# Patient Record
Sex: FEMALE | Race: WHITE | NOT HISPANIC OR LATINO | Employment: FULL TIME | URBAN - METROPOLITAN AREA
[De-identification: names, ages, dates, MRNs, and addresses within clinical notes are randomized per-mention and may not be internally consistent; named-entity substitution may affect disease eponyms.]

---

## 2017-06-15 ENCOUNTER — LAB VISIT (OUTPATIENT)
Dept: LAB | Facility: HOSPITAL | Age: 47
End: 2017-06-15
Attending: INTERNAL MEDICINE
Payer: COMMERCIAL

## 2017-06-15 DIAGNOSIS — C50.919 BREAST CA: ICD-10-CM

## 2017-06-15 LAB
ALBUMIN SERPL BCP-MCNC: 4.4 G/DL
ALP SERPL-CCNC: 95 U/L
ALT SERPL W/O P-5'-P-CCNC: 66 U/L
ANION GAP SERPL CALC-SCNC: 9 MMOL/L
AST SERPL-CCNC: 52 U/L
BASOPHILS # BLD AUTO: 0.02 K/UL
BASOPHILS NFR BLD: 0.3 %
BILIRUB SERPL-MCNC: 0.8 MG/DL
BUN SERPL-MCNC: 12 MG/DL
CALCIUM SERPL-MCNC: 9.3 MG/DL
CHLORIDE SERPL-SCNC: 104 MMOL/L
CO2 SERPL-SCNC: 30 MMOL/L
CREAT SERPL-MCNC: 0.53 MG/DL
DIFFERENTIAL METHOD: NORMAL
EOSINOPHIL # BLD AUTO: 0.2 K/UL
EOSINOPHIL NFR BLD: 2.2 %
ERYTHROCYTE [DISTWIDTH] IN BLOOD BY AUTOMATED COUNT: 12.2 %
EST. GFR  (AFRICAN AMERICAN): >60 ML/MIN/1.73 M^2
EST. GFR  (NON AFRICAN AMERICAN): >60 ML/MIN/1.73 M^2
GLUCOSE SERPL-MCNC: 108 MG/DL
HCT VFR BLD AUTO: 39.1 %
HGB BLD-MCNC: 12.9 G/DL
LYMPHOCYTES # BLD AUTO: 2.7 K/UL
LYMPHOCYTES NFR BLD: 35.5 %
MCH RBC QN AUTO: 30.6 PG
MCHC RBC AUTO-ENTMCNC: 33 %
MCV RBC AUTO: 93 FL
MONOCYTES # BLD AUTO: 0.7 K/UL
MONOCYTES NFR BLD: 8.8 %
NEUTROPHILS # BLD AUTO: 4.1 K/UL
NEUTROPHILS NFR BLD: 53.2 %
NRBC BLD-RTO: 0 /100 WBC
PLATELET # BLD AUTO: 235 K/UL
PMV BLD AUTO: 9.2 FL
POTASSIUM SERPL-SCNC: 4 MMOL/L
PROT SERPL-MCNC: 7.5 G/DL
RBC # BLD AUTO: 4.21 M/UL
SODIUM SERPL-SCNC: 143 MMOL/L
WBC # BLD AUTO: 7.71 K/UL

## 2017-06-15 PROCEDURE — 85025 COMPLETE CBC W/AUTO DIFF WBC: CPT

## 2017-06-15 PROCEDURE — 80053 COMPREHEN METABOLIC PANEL: CPT | Mod: PN

## 2017-06-15 PROCEDURE — 36415 COLL VENOUS BLD VENIPUNCTURE: CPT | Mod: PN

## 2017-06-15 PROCEDURE — 80053 COMPREHEN METABOLIC PANEL: CPT

## 2017-06-15 PROCEDURE — 85025 COMPLETE CBC W/AUTO DIFF WBC: CPT | Mod: PN

## 2017-12-21 ENCOUNTER — LAB VISIT (OUTPATIENT)
Dept: LAB | Facility: HOSPITAL | Age: 47
End: 2017-12-21
Attending: INTERNAL MEDICINE
Payer: COMMERCIAL

## 2017-12-21 DIAGNOSIS — C50.919 MALIGNANT NEOPLASM OF FEMALE BREAST: ICD-10-CM

## 2017-12-21 LAB
ALBUMIN SERPL BCP-MCNC: 4.4 G/DL
ALP SERPL-CCNC: 84 U/L
ALT SERPL W/O P-5'-P-CCNC: 67 U/L
ANION GAP SERPL CALC-SCNC: 11 MMOL/L
AST SERPL-CCNC: 44 U/L
BASOPHILS # BLD AUTO: 0.03 K/UL
BASOPHILS NFR BLD: 0.4 %
BILIRUB SERPL-MCNC: 0.4 MG/DL
BUN SERPL-MCNC: 11 MG/DL
CALCIUM SERPL-MCNC: 9.3 MG/DL
CHLORIDE SERPL-SCNC: 105 MMOL/L
CO2 SERPL-SCNC: 28 MMOL/L
CREAT SERPL-MCNC: 0.49 MG/DL
DIFFERENTIAL METHOD: ABNORMAL
EOSINOPHIL # BLD AUTO: 0.2 K/UL
EOSINOPHIL NFR BLD: 2.2 %
ERYTHROCYTE [DISTWIDTH] IN BLOOD BY AUTOMATED COUNT: 11.9 %
EST. GFR  (AFRICAN AMERICAN): >60 ML/MIN/1.73 M^2
EST. GFR  (NON AFRICAN AMERICAN): >60 ML/MIN/1.73 M^2
GLUCOSE SERPL-MCNC: 91 MG/DL
HCT VFR BLD AUTO: 40.3 %
HGB BLD-MCNC: 13 G/DL
LYMPHOCYTES # BLD AUTO: 2.3 K/UL
LYMPHOCYTES NFR BLD: 28.9 %
MCH RBC QN AUTO: 30.2 PG
MCHC RBC AUTO-ENTMCNC: 32.3 G/DL
MCV RBC AUTO: 94 FL
MONOCYTES # BLD AUTO: 0.6 K/UL
MONOCYTES NFR BLD: 7.6 %
NEUTROPHILS # BLD AUTO: 4.7 K/UL
NEUTROPHILS NFR BLD: 60.9 %
NRBC BLD-RTO: 0 /100 WBC
PLATELET # BLD AUTO: 243 K/UL
PMV BLD AUTO: 9.1 FL
POTASSIUM SERPL-SCNC: 3.8 MMOL/L
PROT SERPL-MCNC: 7.7 G/DL
RBC # BLD AUTO: 4.3 M/UL
SODIUM SERPL-SCNC: 144 MMOL/L
WBC # BLD AUTO: 7.79 K/UL

## 2017-12-21 PROCEDURE — 36415 COLL VENOUS BLD VENIPUNCTURE: CPT | Mod: PN

## 2017-12-21 PROCEDURE — 80053 COMPREHEN METABOLIC PANEL: CPT

## 2017-12-21 PROCEDURE — 85025 COMPLETE CBC W/AUTO DIFF WBC: CPT

## 2017-12-21 PROCEDURE — 80053 COMPREHEN METABOLIC PANEL: CPT | Mod: PN

## 2017-12-21 PROCEDURE — 85025 COMPLETE CBC W/AUTO DIFF WBC: CPT | Mod: PN

## 2018-01-01 ENCOUNTER — LAB VISIT (OUTPATIENT)
Dept: LAB | Facility: HOSPITAL | Age: 48
End: 2018-01-01
Attending: INTERNAL MEDICINE
Payer: COMMERCIAL

## 2018-01-01 DIAGNOSIS — Z17.0 MALIGNANT NEOPLASM OF CENTRAL PORTION OF RIGHT BREAST IN FEMALE, ESTROGEN RECEPTOR POSITIVE: ICD-10-CM

## 2018-01-01 DIAGNOSIS — C50.111 MALIGNANT NEOPLASM OF CENTRAL PORTION OF RIGHT BREAST IN FEMALE, ESTROGEN RECEPTOR POSITIVE: ICD-10-CM

## 2018-01-01 LAB
ALBUMIN SERPL BCP-MCNC: 4.5 G/DL
ALP SERPL-CCNC: 81 U/L
ALT SERPL W/O P-5'-P-CCNC: 152 U/L
ANION GAP SERPL CALC-SCNC: 12 MMOL/L
AST SERPL-CCNC: 122 U/L
BASOPHILS # BLD AUTO: 0.03 K/UL
BASOPHILS NFR BLD: 0.3 %
BILIRUB SERPL-MCNC: 0.6 MG/DL
BUN SERPL-MCNC: 18 MG/DL
CALCIUM SERPL-MCNC: 10 MG/DL
CHLORIDE SERPL-SCNC: 101 MMOL/L
CO2 SERPL-SCNC: 28 MMOL/L
CREAT SERPL-MCNC: 0.47 MG/DL
DIFFERENTIAL METHOD: ABNORMAL
EOSINOPHIL # BLD AUTO: 0.1 K/UL
EOSINOPHIL NFR BLD: 1.5 %
ERYTHROCYTE [DISTWIDTH] IN BLOOD BY AUTOMATED COUNT: 12.1 %
EST. GFR  (AFRICAN AMERICAN): >60 ML/MIN/1.73 M^2
EST. GFR  (NON AFRICAN AMERICAN): >60 ML/MIN/1.73 M^2
GLUCOSE SERPL-MCNC: 97 MG/DL
HCT VFR BLD AUTO: 40 %
HGB BLD-MCNC: 13.1 G/DL
LYMPHOCYTES # BLD AUTO: 2.9 K/UL
LYMPHOCYTES NFR BLD: 32.1 %
MCH RBC QN AUTO: 29.6 PG
MCHC RBC AUTO-ENTMCNC: 32.8 G/DL
MCV RBC AUTO: 91 FL
MONOCYTES # BLD AUTO: 0.6 K/UL
MONOCYTES NFR BLD: 7.1 %
NEUTROPHILS # BLD AUTO: 5.3 K/UL
NEUTROPHILS NFR BLD: 59 %
NRBC BLD-RTO: 0 /100 WBC
PLATELET # BLD AUTO: 247 K/UL
PMV BLD AUTO: 8.7 FL
POTASSIUM SERPL-SCNC: 3.8 MMOL/L
PROT SERPL-MCNC: 7.9 G/DL
RBC # BLD AUTO: 4.42 M/UL
SODIUM SERPL-SCNC: 141 MMOL/L
WBC # BLD AUTO: 8.93 K/UL

## 2018-01-01 PROCEDURE — 80053 COMPREHEN METABOLIC PANEL: CPT | Mod: PN

## 2018-01-01 PROCEDURE — 80053 COMPREHEN METABOLIC PANEL: CPT

## 2018-01-01 PROCEDURE — 85025 COMPLETE CBC W/AUTO DIFF WBC: CPT

## 2018-01-01 PROCEDURE — 36415 COLL VENOUS BLD VENIPUNCTURE: CPT | Mod: PN

## 2018-01-01 PROCEDURE — 85025 COMPLETE CBC W/AUTO DIFF WBC: CPT | Mod: PN

## 2019-01-01 ENCOUNTER — HOSPITAL ENCOUNTER (INPATIENT)
Facility: HOSPITAL | Age: 49
LOS: 4 days | DRG: 640 | End: 2019-06-01
Attending: EMERGENCY MEDICINE | Admitting: EMERGENCY MEDICINE
Payer: COMMERCIAL

## 2019-01-01 ENCOUNTER — LAB VISIT (OUTPATIENT)
Dept: LAB | Facility: HOSPITAL | Age: 49
End: 2019-01-01
Attending: INTERNAL MEDICINE
Payer: COMMERCIAL

## 2019-01-01 ENCOUNTER — DOCUMENTATION ONLY (OUTPATIENT)
Dept: HEMATOLOGY/ONCOLOGY | Facility: CLINIC | Age: 49
End: 2019-01-01

## 2019-01-01 VITALS
BODY MASS INDEX: 25.74 KG/M2 | WEIGHT: 164 LBS | TEMPERATURE: 96 F | RESPIRATION RATE: 14 BRPM | SYSTOLIC BLOOD PRESSURE: 114 MMHG | HEART RATE: 87 BPM | OXYGEN SATURATION: 97 % | DIASTOLIC BLOOD PRESSURE: 55 MMHG | HEIGHT: 67 IN

## 2019-01-01 DIAGNOSIS — G93.41 ENCEPHALOPATHY, METABOLIC: ICD-10-CM

## 2019-01-01 DIAGNOSIS — E83.42 HYPOMAGNESEMIA: ICD-10-CM

## 2019-01-01 DIAGNOSIS — C50.919 MALIGNANT NEOPLASM OF FEMALE BREAST, UNSPECIFIED ESTROGEN RECEPTOR STATUS, UNSPECIFIED LATERALITY, UNSPECIFIED SITE OF BREAST: ICD-10-CM

## 2019-01-01 DIAGNOSIS — J96.11 CHRONIC RESPIRATORY FAILURE WITH HYPOXIA AND HYPERCAPNIA: ICD-10-CM

## 2019-01-01 DIAGNOSIS — C50.919 MALIGNANT NEOPLASM OF BREAST, STAGE 4, UNSPECIFIED LATERALITY: ICD-10-CM

## 2019-01-01 DIAGNOSIS — R79.89 LIVER FUNCTION TEST ABNORMALITY: ICD-10-CM

## 2019-01-01 DIAGNOSIS — I31.39 PERICARDIAL EFFUSION: ICD-10-CM

## 2019-01-01 DIAGNOSIS — E83.52 HYPERCALCEMIA OF MALIGNANCY: Primary | ICD-10-CM

## 2019-01-01 DIAGNOSIS — R74.01 TRANSAMINASEMIA: ICD-10-CM

## 2019-01-01 DIAGNOSIS — R18.8 OTHER ASCITES: ICD-10-CM

## 2019-01-01 DIAGNOSIS — R53.83 FATIGUE: ICD-10-CM

## 2019-01-01 DIAGNOSIS — J96.12 CHRONIC RESPIRATORY FAILURE WITH HYPOXIA AND HYPERCAPNIA: ICD-10-CM

## 2019-01-01 DIAGNOSIS — J96.11 CHRONIC HYPOXEMIC RESPIRATORY FAILURE: ICD-10-CM

## 2019-01-01 LAB
ALBUMIN FLD-MCNC: 0.4 G/DL
ALBUMIN SERPL BCP-MCNC: 1.7 G/DL (ref 3.5–5.2)
ALBUMIN SERPL BCP-MCNC: 1.8 G/DL (ref 3.5–5.2)
ALBUMIN SERPL BCP-MCNC: 1.9 G/DL (ref 3.5–5.2)
ALBUMIN SERPL BCP-MCNC: 2.1 G/DL (ref 3.5–5.2)
ALP SERPL-CCNC: 688 U/L (ref 55–135)
ALP SERPL-CCNC: 753 U/L (ref 55–135)
ALP SERPL-CCNC: 792 U/L (ref 55–135)
ALP SERPL-CCNC: 890 U/L (ref 55–135)
ALT SERPL W/O P-5'-P-CCNC: 156 U/L (ref 10–44)
ALT SERPL W/O P-5'-P-CCNC: 182 U/L (ref 10–44)
ALT SERPL W/O P-5'-P-CCNC: 194 U/L (ref 10–44)
ALT SERPL W/O P-5'-P-CCNC: 217 U/L (ref 10–44)
AMMONIA PLAS-SCNC: 71 UMOL/L (ref 10–50)
AMMONIA PLAS-SCNC: 72 UMOL/L (ref 10–50)
AMMONIA PLAS-SCNC: 93 UMOL/L (ref 10–50)
ANION GAP SERPL CALC-SCNC: 10 MMOL/L (ref 8–16)
ANION GAP SERPL CALC-SCNC: 12 MMOL/L (ref 8–16)
ANION GAP SERPL CALC-SCNC: 7 MMOL/L (ref 8–16)
ANION GAP SERPL CALC-SCNC: 8 MMOL/L (ref 8–16)
APPEARANCE FLD: CLEAR
APTT BLDCRRT: 35.3 SEC (ref 21–32)
APTT BLDCRRT: 36.8 SEC (ref 21–32)
APTT BLDCRRT: 37.5 SEC (ref 21–32)
APTT BLDCRRT: 41.5 SEC (ref 21–32)
AST SERPL-CCNC: 1064 U/L (ref 10–40)
AST SERPL-CCNC: 689 U/L (ref 10–40)
AST SERPL-CCNC: 798 U/L (ref 10–40)
AST SERPL-CCNC: 908 U/L (ref 10–40)
AV INDEX (PROSTH): 0.98
AV MEAN GRADIENT: 5.09 MMHG
AV PEAK GRADIENT: 8.53 MMHG
AV VALVE AREA: 4.15 CM2
AV VELOCITY RATIO: 0.94
BASOPHILS # BLD AUTO: 0.05 K/UL (ref 0–0.2)
BASOPHILS # BLD AUTO: 0.06 K/UL (ref 0–0.2)
BASOPHILS NFR BLD: 0.3 % (ref 0–1.9)
BASOPHILS NFR BLD: 0.3 % (ref 0–1.9)
BASOPHILS NFR BLD: 0.4 % (ref 0–1.9)
BASOPHILS NFR BLD: 0.4 % (ref 0–1.9)
BILIRUB DIRECT SERPL-MCNC: 5.4 MG/DL (ref 0.1–0.3)
BILIRUB FLD-MCNC: 0.9 MG/DL
BILIRUB SERPL-MCNC: 6.7 MG/DL (ref 0.1–1)
BILIRUB SERPL-MCNC: 7.6 MG/DL (ref 0.1–1)
BILIRUB SERPL-MCNC: 8.7 MG/DL (ref 0.1–1)
BILIRUB SERPL-MCNC: 9.1 MG/DL (ref 0.1–1)
BNP SERPL-MCNC: 53 PG/ML (ref 0–99)
BODY FLD TYPE: NORMAL
BODY FLUID SOURCE, BILIRUBIN: NORMAL
BODY FLUID SOURCE, LDH: NORMAL
BSA FOR ECHO PROCEDURE: 1.88 M2
BUN SERPL-MCNC: 19 MG/DL (ref 6–20)
BUN SERPL-MCNC: 21 MG/DL (ref 6–20)
BUN SERPL-MCNC: 26 MG/DL (ref 6–20)
BUN SERPL-MCNC: 35 MG/DL (ref 6–20)
CA-I BLDV-SCNC: 1.51 MMOL/L (ref 1.06–1.42)
CALCIUM SERPL-MCNC: 10.3 MG/DL (ref 8.7–10.5)
CALCIUM SERPL-MCNC: 11.9 MG/DL (ref 8.7–10.5)
CALCIUM SERPL-MCNC: 12.9 MG/DL (ref 8.7–10.5)
CALCIUM SERPL-MCNC: 8.9 MG/DL (ref 8.7–10.5)
CHLORIDE SERPL-SCNC: 100 MMOL/L (ref 95–110)
CHLORIDE SERPL-SCNC: 105 MMOL/L (ref 95–110)
CHLORIDE SERPL-SCNC: 109 MMOL/L (ref 95–110)
CHLORIDE SERPL-SCNC: 94 MMOL/L (ref 95–110)
CO2 SERPL-SCNC: 20 MMOL/L (ref 23–29)
CO2 SERPL-SCNC: 22 MMOL/L (ref 23–29)
CO2 SERPL-SCNC: 26 MMOL/L (ref 23–29)
CO2 SERPL-SCNC: 30 MMOL/L (ref 23–29)
COLOR FLD: YELLOW
CREAT SERPL-MCNC: 0.6 MG/DL (ref 0.5–1.4)
CREAT SERPL-MCNC: 0.7 MG/DL (ref 0.5–1.4)
CREAT SERPL-MCNC: 0.8 MG/DL (ref 0.5–1.4)
CREAT SERPL-MCNC: 1.3 MG/DL (ref 0.5–1.4)
CV ECHO LV RWT: 0.29 CM
DIFFERENTIAL METHOD: ABNORMAL
DOP CALC AO PEAK VEL: 1.46 M/S
DOP CALC AO VTI: 28.53 CM
DOP CALC LVOT AREA: 4.23 CM2
DOP CALC LVOT DIAMETER: 2.32 CM
DOP CALC LVOT PEAK VEL: 1.37 M/S
DOP CALC LVOT STROKE VOLUME: 118.52 CM3
DOP CALCLVOT PEAK VEL VTI: 28.05 CM
E WAVE DECELERATION TIME: 140.53 MSEC
E/A RATIO: 1.22
E/E' RATIO: 5.08
ECHO LV POSTERIOR WALL: 0.7 CM (ref 0.6–1.1)
EOSINOPHIL # BLD AUTO: 0 K/UL (ref 0–0.5)
EOSINOPHIL # BLD AUTO: 0 K/UL (ref 0–0.5)
EOSINOPHIL # BLD AUTO: 0.1 K/UL (ref 0–0.5)
EOSINOPHIL # BLD AUTO: 0.1 K/UL (ref 0–0.5)
EOSINOPHIL NFR BLD: 0 % (ref 0–8)
EOSINOPHIL NFR BLD: 0 % (ref 0–8)
EOSINOPHIL NFR BLD: 0.4 % (ref 0–8)
EOSINOPHIL NFR BLD: 0.7 % (ref 0–8)
ERYTHROCYTE [DISTWIDTH] IN BLOOD BY AUTOMATED COUNT: 21 % (ref 11.5–14.5)
ERYTHROCYTE [DISTWIDTH] IN BLOOD BY AUTOMATED COUNT: 21.6 % (ref 11.5–14.5)
ERYTHROCYTE [DISTWIDTH] IN BLOOD BY AUTOMATED COUNT: 22.4 % (ref 11.5–14.5)
ERYTHROCYTE [DISTWIDTH] IN BLOOD BY AUTOMATED COUNT: 22.8 % (ref 11.5–14.5)
EST. GFR  (AFRICAN AMERICAN): 55.3 ML/MIN/1.73 M^2
EST. GFR  (AFRICAN AMERICAN): >60 ML/MIN/1.73 M^2
EST. GFR  (NON AFRICAN AMERICAN): 48 ML/MIN/1.73 M^2
EST. GFR  (NON AFRICAN AMERICAN): >60 ML/MIN/1.73 M^2
FRACTIONAL SHORTENING: 49 % (ref 28–44)
GGT SERPL-CCNC: 945 U/L (ref 8–55)
GLUCOSE FLD-MCNC: 79 MG/DL
GLUCOSE SERPL-MCNC: 62 MG/DL (ref 70–110)
GLUCOSE SERPL-MCNC: 71 MG/DL (ref 70–110)
GLUCOSE SERPL-MCNC: 75 MG/DL (ref 70–110)
GLUCOSE SERPL-MCNC: 99 MG/DL (ref 70–110)
HCT VFR BLD AUTO: 40.3 % (ref 37–48.5)
HCT VFR BLD AUTO: 40.7 % (ref 37–48.5)
HCT VFR BLD AUTO: 43.3 % (ref 37–48.5)
HCT VFR BLD AUTO: 43.8 % (ref 37–48.5)
HGB BLD-MCNC: 12.2 G/DL (ref 12–16)
HGB BLD-MCNC: 12.3 G/DL (ref 12–16)
HGB BLD-MCNC: 12.9 G/DL (ref 12–16)
HGB BLD-MCNC: 13.5 G/DL (ref 12–16)
IMM GRANULOCYTES # BLD AUTO: 0.2 K/UL (ref 0–0.04)
IMM GRANULOCYTES # BLD AUTO: 0.27 K/UL (ref 0–0.04)
IMM GRANULOCYTES # BLD AUTO: 0.29 K/UL (ref 0–0.04)
IMM GRANULOCYTES # BLD AUTO: 0.42 K/UL (ref 0–0.04)
IMM GRANULOCYTES NFR BLD AUTO: 1.5 % (ref 0–0.5)
IMM GRANULOCYTES NFR BLD AUTO: 1.6 % (ref 0–0.5)
IMM GRANULOCYTES NFR BLD AUTO: 1.9 % (ref 0–0.5)
IMM GRANULOCYTES NFR BLD AUTO: 2.3 % (ref 0–0.5)
INR PPP: 1.3 (ref 0.8–1.2)
INR PPP: 1.4 (ref 0.8–1.2)
INR PPP: 1.4 (ref 0.8–1.2)
INR PPP: 1.5 (ref 0.8–1.2)
INTERVENTRICULAR SEPTUM: 0.83 CM (ref 0.6–1.1)
LA MAJOR: 4.08 CM
LA MINOR: 4.07 CM
LA WIDTH: 3.68 CM
LDH FLD L TO P-CCNC: 98 U/L
LEFT ATRIUM SIZE: 3.5 CM
LEFT ATRIUM VOLUME INDEX: 24 ML/M2
LEFT ATRIUM VOLUME: 44.61 CM3
LEFT INTERNAL DIMENSION IN SYSTOLE: 2.49 CM (ref 2.1–4)
LEFT VENTRICLE DIASTOLIC VOLUME INDEX: 59.76 ML/M2
LEFT VENTRICLE DIASTOLIC VOLUME: 111.07 ML
LEFT VENTRICLE MASS INDEX: 66 G/M2
LEFT VENTRICLE SYSTOLIC VOLUME INDEX: 11.9 ML/M2
LEFT VENTRICLE SYSTOLIC VOLUME: 22.2 ML
LEFT VENTRICULAR INTERNAL DIMENSION IN DIASTOLE: 4.87 CM (ref 3.5–6)
LEFT VENTRICULAR MASS: 122.61 G
LIPASE SERPL-CCNC: 51 U/L (ref 4–60)
LV LATERAL E/E' RATIO: 4.4
LV SEPTAL E/E' RATIO: 6
LYMPHOCYTES # BLD AUTO: 0.6 K/UL (ref 1–4.8)
LYMPHOCYTES # BLD AUTO: 0.8 K/UL (ref 1–4.8)
LYMPHOCYTES # BLD AUTO: 1.6 K/UL (ref 1–4.8)
LYMPHOCYTES # BLD AUTO: 1.8 K/UL (ref 1–4.8)
LYMPHOCYTES NFR BLD: 11.7 % (ref 18–48)
LYMPHOCYTES NFR BLD: 13.5 % (ref 18–48)
LYMPHOCYTES NFR BLD: 3.4 % (ref 18–48)
LYMPHOCYTES NFR BLD: 4.4 % (ref 18–48)
LYMPHOCYTES NFR FLD MANUAL: 72 %
MAGNESIUM SERPL-MCNC: 1.5 MG/DL (ref 1.6–2.6)
MAGNESIUM SERPL-MCNC: 1.6 MG/DL (ref 1.6–2.6)
MAGNESIUM SERPL-MCNC: 1.8 MG/DL (ref 1.6–2.6)
MAGNESIUM SERPL-MCNC: 1.8 MG/DL (ref 1.6–2.6)
MCH RBC QN AUTO: 27.5 PG (ref 27–31)
MCH RBC QN AUTO: 27.5 PG (ref 27–31)
MCH RBC QN AUTO: 27.7 PG (ref 27–31)
MCH RBC QN AUTO: 28.3 PG (ref 27–31)
MCHC RBC AUTO-ENTMCNC: 29.8 G/DL (ref 32–36)
MCHC RBC AUTO-ENTMCNC: 30.2 G/DL (ref 32–36)
MCHC RBC AUTO-ENTMCNC: 30.3 G/DL (ref 32–36)
MCHC RBC AUTO-ENTMCNC: 30.8 G/DL (ref 32–36)
MCV RBC AUTO: 89 FL (ref 82–98)
MCV RBC AUTO: 91 FL (ref 82–98)
MCV RBC AUTO: 93 FL (ref 82–98)
MCV RBC AUTO: 94 FL (ref 82–98)
MESOTHL CELL NFR FLD MANUAL: 6 %
MONOCYTES # BLD AUTO: 1 K/UL (ref 0.3–1)
MONOCYTES # BLD AUTO: 1.1 K/UL (ref 0.3–1)
MONOCYTES # BLD AUTO: 1.1 K/UL (ref 0.3–1)
MONOCYTES # BLD AUTO: 1.2 K/UL (ref 0.3–1)
MONOCYTES NFR BLD: 5.7 % (ref 4–15)
MONOCYTES NFR BLD: 6.3 % (ref 4–15)
MONOCYTES NFR BLD: 7.5 % (ref 4–15)
MONOCYTES NFR BLD: 8.9 % (ref 4–15)
MONOS+MACROS NFR FLD MANUAL: 13 %
MV PEAK A VEL: 0.54 M/S
MV PEAK E VEL: 0.66 M/S
NEUTROPHILS # BLD AUTO: 10.3 K/UL (ref 1.8–7.7)
NEUTROPHILS # BLD AUTO: 10.6 K/UL (ref 1.8–7.7)
NEUTROPHILS # BLD AUTO: 15.7 K/UL (ref 1.8–7.7)
NEUTROPHILS # BLD AUTO: 16.3 K/UL (ref 1.8–7.7)
NEUTROPHILS NFR BLD: 76.4 % (ref 38–73)
NEUTROPHILS NFR BLD: 76.7 % (ref 38–73)
NEUTROPHILS NFR BLD: 86.7 % (ref 38–73)
NEUTROPHILS NFR BLD: 89 % (ref 38–73)
NEUTROPHILS NFR FLD MANUAL: 9 %
NRBC BLD-RTO: 0 /100 WBC
NRBC BLD-RTO: 1 /100 WBC
OSMOLALITY SERPL: 294 MOSM/KG (ref 275–295)
PHOSPHATE SERPL-MCNC: 3.1 MG/DL (ref 2.7–4.5)
PHOSPHATE SERPL-MCNC: 3.2 MG/DL (ref 2.7–4.5)
PHOSPHATE SERPL-MCNC: 4.2 MG/DL (ref 2.7–4.5)
PLATELET # BLD AUTO: 177 K/UL (ref 150–350)
PLATELET # BLD AUTO: 178 K/UL (ref 150–350)
PLATELET # BLD AUTO: 182 K/UL (ref 150–350)
PLATELET # BLD AUTO: 210 K/UL (ref 150–350)
PMV BLD AUTO: 9.2 FL (ref 9.2–12.9)
PMV BLD AUTO: 9.4 FL (ref 9.2–12.9)
PMV BLD AUTO: 9.6 FL (ref 9.2–12.9)
PMV BLD AUTO: 9.8 FL (ref 9.2–12.9)
POTASSIUM SERPL-SCNC: 4.2 MMOL/L (ref 3.5–5.1)
POTASSIUM SERPL-SCNC: 4.7 MMOL/L (ref 3.5–5.1)
PROCALCITONIN SERPL IA-MCNC: 0.84 NG/ML
PROT SERPL-MCNC: 6.5 G/DL (ref 6–8.4)
PROT SERPL-MCNC: 6.7 G/DL (ref 6–8.4)
PROT SERPL-MCNC: 7.2 G/DL (ref 6–8.4)
PROT SERPL-MCNC: 7.9 G/DL (ref 6–8.4)
PROTHROMBIN TIME: 12.8 SEC (ref 9–12.5)
PROTHROMBIN TIME: 14.1 SEC (ref 9–12.5)
PROTHROMBIN TIME: 14.3 SEC (ref 9–12.5)
PROTHROMBIN TIME: 14.7 SEC (ref 9–12.5)
RA MAJOR: 4.16 CM
RA PRESSURE: 3 MMHG
RA WIDTH: 3.44 CM
RBC # BLD AUTO: 4.34 M/UL (ref 4–5.4)
RBC # BLD AUTO: 4.44 M/UL (ref 4–5.4)
RBC # BLD AUTO: 4.65 M/UL (ref 4–5.4)
RBC # BLD AUTO: 4.91 M/UL (ref 4–5.4)
RIGHT VENTRICULAR END-DIASTOLIC DIMENSION: 3.27 CM
SINUS: 3.79 CM
SODIUM SERPL-SCNC: 132 MMOL/L (ref 136–145)
SODIUM SERPL-SCNC: 133 MMOL/L (ref 136–145)
SODIUM SERPL-SCNC: 137 MMOL/L (ref 136–145)
SODIUM SERPL-SCNC: 141 MMOL/L (ref 136–145)
SPECIMEN SOURCE: NORMAL
SPECIMEN SOURCE: NORMAL
TDI LATERAL: 0.15
TDI SEPTAL: 0.11
TDI: 0.13
TRICUSPID ANNULAR PLANE SYSTOLIC EXCURSION: 2.46 CM
WBC # BLD AUTO: 13.36 K/UL (ref 3.9–12.7)
WBC # BLD AUTO: 13.88 K/UL (ref 3.9–12.7)
WBC # BLD AUTO: 18.14 K/UL (ref 3.9–12.7)
WBC # BLD AUTO: 18.32 K/UL (ref 3.9–12.7)
WBC # FLD: 74 /CU MM

## 2019-01-01 PROCEDURE — 20600001 HC STEP DOWN PRIVATE ROOM

## 2019-01-01 PROCEDURE — 96361 HYDRATE IV INFUSION ADD-ON: CPT

## 2019-01-01 PROCEDURE — 63600175 PHARM REV CODE 636 W HCPCS: Mod: JG | Performed by: STUDENT IN AN ORGANIZED HEALTH CARE EDUCATION/TRAINING PROGRAM

## 2019-01-01 PROCEDURE — 36415 COLL VENOUS BLD VENIPUNCTURE: CPT

## 2019-01-01 PROCEDURE — 25000003 PHARM REV CODE 250: Performed by: INTERNAL MEDICINE

## 2019-01-01 PROCEDURE — 83735 ASSAY OF MAGNESIUM: CPT

## 2019-01-01 PROCEDURE — 25000003 PHARM REV CODE 250: Performed by: STUDENT IN AN ORGANIZED HEALTH CARE EDUCATION/TRAINING PROGRAM

## 2019-01-01 PROCEDURE — 83930 ASSAY OF BLOOD OSMOLALITY: CPT

## 2019-01-01 PROCEDURE — 83615 LACTATE (LD) (LDH) ENZYME: CPT

## 2019-01-01 PROCEDURE — 25000003 PHARM REV CODE 250: Performed by: PHYSICIAN ASSISTANT

## 2019-01-01 PROCEDURE — 84100 ASSAY OF PHOSPHORUS: CPT

## 2019-01-01 PROCEDURE — 80053 COMPREHEN METABOLIC PANEL: CPT

## 2019-01-01 PROCEDURE — 63600175 PHARM REV CODE 636 W HCPCS: Performed by: STUDENT IN AN ORGANIZED HEALTH CARE EDUCATION/TRAINING PROGRAM

## 2019-01-01 PROCEDURE — 99233 PR SUBSEQUENT HOSPITAL CARE,LEVL III: ICD-10-PCS | Mod: ,,, | Performed by: INTERNAL MEDICINE

## 2019-01-01 PROCEDURE — 85025 COMPLETE CBC W/AUTO DIFF WBC: CPT

## 2019-01-01 PROCEDURE — 81162 BRCA1&2 GEN FULL SEQ DUP/DEL: CPT

## 2019-01-01 PROCEDURE — 87040 BLOOD CULTURE FOR BACTERIA: CPT

## 2019-01-01 PROCEDURE — 63600175 PHARM REV CODE 636 W HCPCS: Performed by: INTERNAL MEDICINE

## 2019-01-01 PROCEDURE — 82247 BILIRUBIN TOTAL: CPT

## 2019-01-01 PROCEDURE — 99223 PR INITIAL HOSPITAL CARE,LEVL III: ICD-10-PCS | Mod: ,,, | Performed by: INTERNAL MEDICINE

## 2019-01-01 PROCEDURE — 99233 SBSQ HOSP IP/OBS HIGH 50: CPT | Mod: ,,, | Performed by: INTERNAL MEDICINE

## 2019-01-01 PROCEDURE — 96375 TX/PRO/DX INJ NEW DRUG ADDON: CPT

## 2019-01-01 PROCEDURE — 89051 BODY FLUID CELL COUNT: CPT

## 2019-01-01 PROCEDURE — 82140 ASSAY OF AMMONIA: CPT

## 2019-01-01 PROCEDURE — 85730 THROMBOPLASTIN TIME PARTIAL: CPT

## 2019-01-01 PROCEDURE — 93005 ELECTROCARDIOGRAM TRACING: CPT

## 2019-01-01 PROCEDURE — 63600175 PHARM REV CODE 636 W HCPCS: Performed by: PHYSICIAN ASSISTANT

## 2019-01-01 PROCEDURE — 25500020 PHARM REV CODE 255: Performed by: INTERNAL MEDICINE

## 2019-01-01 PROCEDURE — 99223 1ST HOSP IP/OBS HIGH 75: CPT | Mod: ,,, | Performed by: INTERNAL MEDICINE

## 2019-01-01 PROCEDURE — 85610 PROTHROMBIN TIME: CPT

## 2019-01-01 PROCEDURE — 82945 GLUCOSE OTHER FLUID: CPT

## 2019-01-01 PROCEDURE — 99285 EMERGENCY DEPT VISIT HI MDM: CPT | Mod: 25

## 2019-01-01 PROCEDURE — 99285 PR EMERGENCY DEPT VISIT,LEVEL V: ICD-10-PCS | Mod: ,,, | Performed by: PHYSICIAN ASSISTANT

## 2019-01-01 PROCEDURE — 82330 ASSAY OF CALCIUM: CPT

## 2019-01-01 PROCEDURE — 93010 ELECTROCARDIOGRAM REPORT: CPT | Mod: ,,, | Performed by: INTERNAL MEDICINE

## 2019-01-01 PROCEDURE — 83880 ASSAY OF NATRIURETIC PEPTIDE: CPT

## 2019-01-01 PROCEDURE — 96365 THER/PROPH/DIAG IV INF INIT: CPT

## 2019-01-01 PROCEDURE — 82248 BILIRUBIN DIRECT: CPT

## 2019-01-01 PROCEDURE — 93010 EKG 12-LEAD: ICD-10-PCS | Mod: ,,, | Performed by: INTERNAL MEDICINE

## 2019-01-01 PROCEDURE — A9585 GADOBUTROL INJECTION: HCPCS | Performed by: INTERNAL MEDICINE

## 2019-01-01 PROCEDURE — 12000002 HC ACUTE/MED SURGE SEMI-PRIVATE ROOM

## 2019-01-01 PROCEDURE — 99285 EMERGENCY DEPT VISIT HI MDM: CPT | Mod: ,,, | Performed by: PHYSICIAN ASSISTANT

## 2019-01-01 PROCEDURE — 82977 ASSAY OF GGT: CPT

## 2019-01-01 PROCEDURE — 84145 PROCALCITONIN (PCT): CPT

## 2019-01-01 PROCEDURE — 82042 OTHER SOURCE ALBUMIN QUAN EA: CPT

## 2019-01-01 PROCEDURE — 83690 ASSAY OF LIPASE: CPT

## 2019-01-01 RX ORDER — SODIUM CHLORIDE 9 MG/ML
INJECTION, SOLUTION INTRAVENOUS CONTINUOUS
Status: DISCONTINUED | OUTPATIENT
Start: 2019-01-01 | End: 2019-01-01

## 2019-01-01 RX ORDER — SCOLOPAMINE TRANSDERMAL SYSTEM 1 MG/1
1 PATCH, EXTENDED RELEASE TRANSDERMAL
Status: DISCONTINUED | OUTPATIENT
Start: 2019-01-01 | End: 2019-01-01 | Stop reason: HOSPADM

## 2019-01-01 RX ORDER — OXYCODONE HYDROCHLORIDE 10 MG/1
10 TABLET ORAL EVERY 4 HOURS PRN
Qty: 20 TABLET | Refills: 0 | Status: CANCELLED
Start: 2019-01-01

## 2019-01-01 RX ORDER — ONDANSETRON 2 MG/ML
4 INJECTION INTRAMUSCULAR; INTRAVENOUS
Status: COMPLETED | OUTPATIENT
Start: 2019-01-01 | End: 2019-01-01

## 2019-01-01 RX ORDER — MORPHINE SULFATE 2 MG/ML
2 INJECTION, SOLUTION INTRAMUSCULAR; INTRAVENOUS
Status: DISCONTINUED | OUTPATIENT
Start: 2019-01-01 | End: 2019-01-01

## 2019-01-01 RX ORDER — MORPHINE SULFATE 2 MG/ML
1 INJECTION, SOLUTION INTRAMUSCULAR; INTRAVENOUS
Status: DISCONTINUED | OUTPATIENT
Start: 2019-01-01 | End: 2019-01-01 | Stop reason: HOSPADM

## 2019-01-01 RX ORDER — IBUPROFEN 200 MG
24 TABLET ORAL
Status: DISCONTINUED | OUTPATIENT
Start: 2019-01-01 | End: 2019-01-01

## 2019-01-01 RX ORDER — OXYCODONE HYDROCHLORIDE 10 MG/1
10 TABLET ORAL EVERY 6 HOURS PRN
Status: DISCONTINUED | OUTPATIENT
Start: 2019-01-01 | End: 2019-01-01

## 2019-01-01 RX ORDER — ONDANSETRON 2 MG/ML
4 INJECTION INTRAMUSCULAR; INTRAVENOUS EVERY 8 HOURS PRN
Status: DISCONTINUED | OUTPATIENT
Start: 2019-01-01 | End: 2019-01-01 | Stop reason: HOSPADM

## 2019-01-01 RX ORDER — LORAZEPAM 2 MG/ML
1 INJECTION INTRAMUSCULAR EVERY 6 HOURS PRN
Status: DISCONTINUED | OUTPATIENT
Start: 2019-01-01 | End: 2019-01-01 | Stop reason: HOSPADM

## 2019-01-01 RX ORDER — GLUCAGON 1 MG
1 KIT INJECTION
Status: DISCONTINUED | OUTPATIENT
Start: 2019-01-01 | End: 2019-01-01

## 2019-01-01 RX ORDER — LORAZEPAM 1 MG/1
1 TABLET ORAL NIGHTLY
Status: DISCONTINUED | OUTPATIENT
Start: 2019-01-01 | End: 2019-01-01

## 2019-01-01 RX ORDER — LACTULOSE 10 G/15ML
20 SOLUTION ORAL 3 TIMES DAILY
Status: DISCONTINUED | OUTPATIENT
Start: 2019-01-01 | End: 2019-01-01

## 2019-01-01 RX ORDER — SERTRALINE HYDROCHLORIDE 50 MG/1
50 TABLET, FILM COATED ORAL NIGHTLY
Status: CANCELLED
Start: 2019-01-01

## 2019-01-01 RX ORDER — AMOXICILLIN 250 MG
2 CAPSULE ORAL 2 TIMES DAILY
Status: DISCONTINUED | OUTPATIENT
Start: 2019-01-01 | End: 2019-01-01

## 2019-01-01 RX ORDER — IBUPROFEN 200 MG
16 TABLET ORAL
Status: DISCONTINUED | OUTPATIENT
Start: 2019-01-01 | End: 2019-01-01

## 2019-01-01 RX ORDER — NALOXONE HCL 0.4 MG/ML
0.4 VIAL (ML) INJECTION ONCE
Status: DISCONTINUED | OUTPATIENT
Start: 2019-01-01 | End: 2019-01-01

## 2019-01-01 RX ORDER — DIAZEPAM 5 MG/ML
5 INJECTION, SOLUTION INTRAMUSCULAR; INTRAVENOUS ONCE AS NEEDED
Status: COMPLETED | OUTPATIENT
Start: 2019-01-01 | End: 2019-01-01

## 2019-01-01 RX ORDER — GADOBUTROL 604.72 MG/ML
8 INJECTION INTRAVENOUS
Status: COMPLETED | OUTPATIENT
Start: 2019-01-01 | End: 2019-01-01

## 2019-01-01 RX ORDER — MAGNESIUM SULFATE HEPTAHYDRATE 40 MG/ML
2 INJECTION, SOLUTION INTRAVENOUS ONCE
Status: COMPLETED | OUTPATIENT
Start: 2019-01-01 | End: 2019-01-01

## 2019-01-01 RX ORDER — CALCITONIN SALMON 200 [USP'U]/ML
4 INJECTION, SOLUTION INTRAMUSCULAR; SUBCUTANEOUS 2 TIMES DAILY
Status: COMPLETED | OUTPATIENT
Start: 2019-01-01 | End: 2019-01-01

## 2019-01-01 RX ORDER — LACTULOSE 10 G/15ML
10 SOLUTION ORAL 3 TIMES DAILY
Status: DISCONTINUED | OUTPATIENT
Start: 2019-01-01 | End: 2019-01-01

## 2019-01-01 RX ORDER — LORAZEPAM 1 MG/1
1 TABLET ORAL EVERY 8 HOURS PRN
Status: DISCONTINUED | OUTPATIENT
Start: 2019-01-01 | End: 2019-01-01

## 2019-01-01 RX ORDER — SODIUM CHLORIDE 0.9 % (FLUSH) 0.9 %
10 SYRINGE (ML) INJECTION
Status: DISCONTINUED | OUTPATIENT
Start: 2019-01-01 | End: 2019-01-01 | Stop reason: HOSPADM

## 2019-01-01 RX ORDER — HYDROMORPHONE HYDROCHLORIDE 1 MG/ML
1 INJECTION, SOLUTION INTRAMUSCULAR; INTRAVENOUS; SUBCUTANEOUS ONCE
Status: COMPLETED | OUTPATIENT
Start: 2019-01-01 | End: 2019-01-01

## 2019-01-01 RX ORDER — SERTRALINE HYDROCHLORIDE 50 MG/1
50 TABLET, FILM COATED ORAL NIGHTLY
Status: DISCONTINUED | OUTPATIENT
Start: 2019-01-01 | End: 2019-01-01 | Stop reason: HOSPADM

## 2019-01-01 RX ADMIN — SODIUM CHLORIDE: 0.9 INJECTION, SOLUTION INTRAVENOUS at 07:05

## 2019-01-01 RX ADMIN — GADOBUTROL 8 ML: 604.72 INJECTION INTRAVENOUS at 06:05

## 2019-01-01 RX ADMIN — PIPERACILLIN AND TAZOBACTAM 4.5 G: 4; .5 INJECTION, POWDER, LYOPHILIZED, FOR SOLUTION INTRAVENOUS; PARENTERAL at 09:05

## 2019-01-01 RX ADMIN — CALCITONIN SALMON 282 UNITS: 200 INJECTION, SOLUTION INTRAMUSCULAR; SUBCUTANEOUS at 02:05

## 2019-01-01 RX ADMIN — PIPERACILLIN AND TAZOBACTAM 4.5 G: 4; .5 INJECTION, POWDER, LYOPHILIZED, FOR SOLUTION INTRAVENOUS; PARENTERAL at 12:05

## 2019-01-01 RX ADMIN — SERTRALINE HYDROCHLORIDE 50 MG: 50 TABLET ORAL at 09:05

## 2019-01-01 RX ADMIN — SODIUM CHLORIDE: 0.9 INJECTION, SOLUTION INTRAVENOUS at 11:05

## 2019-01-01 RX ADMIN — SENNOSIDES AND DOCUSATE SODIUM 2 TABLET: 8.6; 5 TABLET ORAL at 09:05

## 2019-01-01 RX ADMIN — SODIUM CHLORIDE: 0.9 INJECTION, SOLUTION INTRAVENOUS at 01:05

## 2019-01-01 RX ADMIN — LACTULOSE 20 G: 20 SOLUTION ORAL at 09:05

## 2019-01-01 RX ADMIN — CALCITONIN SALMON 282 UNITS: 200 INJECTION, SOLUTION INTRAMUSCULAR; SUBCUTANEOUS at 10:05

## 2019-01-01 RX ADMIN — SODIUM CHLORIDE: 0.9 INJECTION, SOLUTION INTRAVENOUS at 05:05

## 2019-01-01 RX ADMIN — DIAZEPAM 5 MG: 5 INJECTION, SOLUTION INTRAMUSCULAR; INTRAVENOUS at 05:05

## 2019-01-01 RX ADMIN — OXYCODONE HYDROCHLORIDE 10 MG: 10 TABLET ORAL at 01:05

## 2019-01-01 RX ADMIN — SCOPALAMINE 1 PATCH: 1 PATCH, EXTENDED RELEASE TRANSDERMAL at 08:05

## 2019-01-01 RX ADMIN — MORPHINE SULFATE 1 MG: 2 INJECTION, SOLUTION INTRAMUSCULAR; INTRAVENOUS at 10:05

## 2019-01-01 RX ADMIN — PIPERACILLIN AND TAZOBACTAM 4.5 G: 4; .5 INJECTION, POWDER, LYOPHILIZED, FOR SOLUTION INTRAVENOUS; PARENTERAL at 05:05

## 2019-01-01 RX ADMIN — ONDANSETRON 4 MG: 2 INJECTION INTRAMUSCULAR; INTRAVENOUS at 05:05

## 2019-01-01 RX ADMIN — CALCITONIN SALMON 282 UNITS: 200 INJECTION, SOLUTION INTRAMUSCULAR; SUBCUTANEOUS at 09:05

## 2019-01-01 RX ADMIN — LORAZEPAM 1 MG: 2 INJECTION INTRAMUSCULAR; INTRAVENOUS at 11:05

## 2019-01-01 RX ADMIN — SODIUM CHLORIDE 1000 ML: 0.9 INJECTION, SOLUTION INTRAVENOUS at 07:05

## 2019-01-01 RX ADMIN — ZOLEDRONIC ACID 4 MG: 0.04 INJECTION, SOLUTION INTRAVENOUS at 11:05

## 2019-01-01 RX ADMIN — HYDROMORPHONE HYDROCHLORIDE 1 MG: 1 INJECTION, SOLUTION INTRAMUSCULAR; INTRAVENOUS; SUBCUTANEOUS at 03:05

## 2019-01-01 RX ADMIN — ONDANSETRON 4 MG: 2 INJECTION INTRAMUSCULAR; INTRAVENOUS at 07:05

## 2019-01-01 RX ADMIN — PIPERACILLIN AND TAZOBACTAM 4.5 G: 4; .5 INJECTION, POWDER, LYOPHILIZED, FOR SOLUTION INTRAVENOUS; PARENTERAL at 04:05

## 2019-01-01 RX ADMIN — MAGNESIUM SULFATE IN WATER 2 G: 40 INJECTION, SOLUTION INTRAVENOUS at 08:05

## 2019-01-01 RX ADMIN — LACTULOSE 10 G: 20 SOLUTION ORAL at 03:05

## 2019-01-01 RX ADMIN — LORAZEPAM 1 MG: 2 INJECTION INTRAMUSCULAR; INTRAVENOUS at 06:05

## 2019-01-01 RX ADMIN — IOHEXOL 75 ML: 350 INJECTION, SOLUTION INTRAVENOUS at 10:05

## 2019-04-27 PROBLEM — R06.02 SOB (SHORTNESS OF BREATH): Status: ACTIVE | Noted: 2019-01-01

## 2019-04-27 PROBLEM — R74.01 TRANSAMINITIS: Status: ACTIVE | Noted: 2019-01-01

## 2019-04-27 PROBLEM — I31.39 PERICARDIAL EFFUSION: Status: ACTIVE | Noted: 2019-01-01

## 2019-05-05 PROBLEM — I48.91 ATRIAL FIBRILLATION WITH RAPID VENTRICULAR RESPONSE: Status: ACTIVE | Noted: 2019-01-01

## 2019-05-05 PROBLEM — K59.01 SLOW TRANSIT CONSTIPATION: Status: ACTIVE | Noted: 2019-01-01

## 2019-05-07 PROBLEM — M41.24 OTHER IDIOPATHIC SCOLIOSIS, THORACIC REGION: Status: ACTIVE | Noted: 2019-01-01

## 2019-05-07 PROBLEM — J96.11 CHRONIC RESPIRATORY FAILURE WITH HYPOXIA AND HYPERCAPNIA: Status: ACTIVE | Noted: 2019-01-01

## 2019-05-07 PROBLEM — R06.89 HYPERCAPNIA: Status: ACTIVE | Noted: 2019-01-01

## 2019-05-07 PROBLEM — J96.12 CHRONIC RESPIRATORY FAILURE WITH HYPOXIA AND HYPERCAPNIA: Status: ACTIVE | Noted: 2019-01-01

## 2019-05-28 PROBLEM — E83.52 HYPERCALCEMIA OF MALIGNANCY: Status: ACTIVE | Noted: 2019-01-01

## 2019-05-28 PROBLEM — R53.83 FATIGUE: Status: ACTIVE | Noted: 2019-01-01

## 2019-05-28 PROBLEM — R62.7 FAILURE TO THRIVE SYNDROME, ADULT: Status: ACTIVE | Noted: 2019-01-01

## 2019-05-28 NOTE — PROGRESS NOTES
Called by Ramo Mckeon (patient's brother in law) who has HIPPA privileges to discuss her case.    He is concerned about her decline and seeking a second opinion  He reports that she had a radical double mastectomy 4 years ago  Just recently she was diagnosed with metastatic breast cancer  Images reviewed  - innumerable liver metastases  - 7 mm cerebellar met, possible frontal lesion  Pathology from liver biopsy reviewed:  - confirms metastatic disease. ER+, [place] -, Her 2 jeovanny -  Labs reviewed from almost 2 weeks ago:  [id number]  Tb- 2.2, rapidly rising lfts  Hypercalcemic    Honest discussion held regarding current nature of her disease  She is in visceral crisis  He states she is confused and fatigued, very weak  We discussed that a window for any treatment is certainly narrowing    Recommend inpatient admission for treatment of hypercalcemia, to reassess lfts and discuss with hepatology if amenable to a stent which may not be possible, assessment for chemo in setting of crisis to be weighed against above and performance status  With AMS I would also check an ammonia level    He will discuss with patient and spouse and either bring to ER tonight or let us direct admit in AM

## 2019-05-29 PROBLEM — E80.6 HYPERBILIRUBINEMIA: Status: ACTIVE | Noted: 2019-01-01

## 2019-05-29 NOTE — ASSESSMENT & PLAN NOTE
- This could be from underlying pericardial effusion or possible  metastatic disease as seen in prior CT scan (nodularity)  - Stable problem, no acute change, continue current oxygen based on her home dose

## 2019-05-29 NOTE — PLAN OF CARE
Problem: Adult Inpatient Plan of Care  Goal: Plan of Care Review  Outcome: Ongoing (interventions implemented as appropriate)  Pt AAOx4 with nonskid footwear on and bed locked and in lowest position with bed rails up x2. Call light is within reach and  is at the bedside. Pt with c/o back pain with relief obtained by prn oxycodone x1. Given prn IV zofran x1 for nausea. IVF @ 200. Zosyn IVPB infusing. NSR on telemetry. Remained afebrile throughout shift with no falls or injuries. NPO at this time. MRI of brain postponed to AM. Will continue to monitor.

## 2019-05-29 NOTE — ED PROVIDER NOTES
Encounter Date: 5/28/2019       History     Chief Complaint   Patient presents with    Multiple Complaints     stage 4 breast cancer, brain, spine and liver mets, on home oxygen     48-year-old female with AFib and stage IV breast cancer with mets to brain, liver and spine presents for admission.  The patient had breast cancer treatment completed 4 years ago with double mastectomy and hysterectomy, she was just recently diagnosed with stage IV disease.  She has currently been followed by an oncologist on the [facility] but is transferring to [facility] for care.  She has been exceedingly weak recently, noted to be hypercalcemic on outpatient labs.  She is on 3 liters/minute home oxygen, she is unable to clarify whether she is increasingly short of breath. Endorses some balance difficulties, denies numbness/tingling/weakness or bowel or bladder incontinence.  She endorses nausea without vomiting as well as abdominal distension.  She denies abdominal pain, changes in bowel movements, urinary symptoms, chest pain, fevers or leg swelling.         Review of patient's allergies indicates:   Allergen Reactions    Codeine     Norco [hydrocodone-acetaminophen] Nausea And Vomiting     Past Medical History:   Diagnosis Date    Breast cancer      Past Surgical History:   Procedure Laterality Date    BREAST SURGERY      CARDIAC CATHETERIZATION  12/14/2015    no stents    NASAL SINUS SURGERY      Pericardiocentesis-RM # 388 A N/A 4/30/2019    [facility]    TONSILLECTOMY       Family History   Problem Relation Age of Onset    Heart attack Neg Hx     Heart disease Neg Hx     Hypertension Neg Hx      Social History     Tobacco Use    Smoking status: Never Smoker   Substance Use Topics    Alcohol use: Yes     Alcohol/week: 3.0 oz     Types: 5 Glasses of wine per week     Comment: few times a week    Drug use: Never     Review of Systems   Constitutional: Positive for fatigue. Negative  for appetite change, chills, diaphoresis and fever.   HENT: Positive for nosebleeds. Negative for sinus pressure and sinus pain.    Eyes: Negative for photophobia and visual disturbance.   Respiratory: Positive for shortness of breath. Negative for cough.    Cardiovascular: Negative for chest pain, palpitations and leg swelling.   Gastrointestinal: Positive for nausea. Negative for abdominal pain, anal bleeding, blood in stool, constipation, diarrhea, rectal pain and vomiting.   Genitourinary: Negative for difficulty urinating, dysuria, flank pain, frequency, hematuria and urgency.   Musculoskeletal: Positive for back pain and gait problem.   Skin: Negative for color change and wound.   Neurological: Negative for weakness, light-headedness, numbness and headaches.   Hematological: Negative for adenopathy. Bruises/bleeds easily.       Physical Exam     Initial Vitals [05/28/19 1833]   BP Pulse Resp Temp SpO2   124/67 83 18 97.3 °F (36.3 °C) 98 %      MAP       --         Physical Exam    Vitals reviewed.  Constitutional: She is not diaphoretic. No distress.   Chronically ill-appearing but nontoxic.   at bedside   HENT:   Head: Normocephalic and atraumatic.   Eyes: EOM are normal. Pupils are equal, round, and reactive to light. Scleral icterus is present.   Neck: Normal range of motion. Neck supple.   Cardiovascular: Normal rate, regular rhythm, normal heart sounds and intact distal pulses. Exam reveals no gallop and no friction rub.    No murmur heard.  Pulmonary/Chest: Effort normal. No accessory muscle usage. No tachypnea. No respiratory distress. She has decreased breath sounds in the left lower field. She has no wheezes. She has no rhonchi. She has no rales. She exhibits no tenderness.   On O2 3 liters/minute by nasal cannula   Abdominal: Bowel sounds are normal. She exhibits distension. She exhibits no mass. There is no tenderness. There is no rebound and no guarding.   Musculoskeletal: Normal range of  motion.   Neurological: She is alert.   Alert and oriented to self and place.  Unaware of date.  Chronic per .   Skin: Skin is warm and dry.   Psychiatric: She has a normal mood and affect.         ED Course   Procedures  Labs Reviewed   CBC W/ AUTO DIFFERENTIAL - Abnormal; Notable for the following components:       Result Value    WBC 13.36 (*)     Mean Corpuscular Hemoglobin Conc 30.8 (*)     RDW 21.6 (*)     Immature Granulocytes 1.5 (*)     Gran # (ANC) 10.3 (*)     Immature Grans (Abs) 0.20 (*)     Gran% 76.7 (*)     Lymph% 13.5 (*)     All other components within normal limits   COMPREHENSIVE METABOLIC PANEL - Abnormal; Notable for the following components:    Sodium 132 (*)     Chloride 94 (*)     CO2 30 (*)     BUN, Bld 21 (*)     Calcium 12.9 (*)     Albumin 2.1 (*)     Total Bilirubin 7.6 (*)     Alkaline Phosphatase 890 (*)      (*)      (*)     All other components within normal limits   PROTIME-INR - Abnormal; Notable for the following components:    Prothrombin Time 12.8 (*)     INR 1.3 (*)     All other components within normal limits   APTT - Abnormal; Notable for the following components:    aPTT 35.3 (*)     All other components within normal limits   CALCIUM, IONIZED - Abnormal; Notable for the following components:    Calcium, Ion 1.51 (*)     All other components within normal limits   CULTURE, BLOOD   MAGNESIUM   URINALYSIS, REFLEX TO URINE CULTURE   BILIRUBIN, DIRECT   GAMMA GT   OSMOLALITY, SERUM   LIPASE   PROCALCITONIN   B-TYPE NATRIURETIC PEPTIDE          Imaging Results    None          Medical Decision Making:   History:   Old Medical Records: I decided to obtain old medical records.  Clinical Tests:   Lab Tests: Ordered and Reviewed  Medical Tests: Ordered and Reviewed  Other:   I have discussed this case with another health care provider.       <> Summary of the Discussion: Heme Onc consulted for admission for hypercalcemia       APC / Resident Notes:    48-year-old female presenting for admission for stage IV breast cancer and hypercalcemia.  She is endorsing fatigue, nausea and chronic shortness of breath but no other acute complaints. Differential diagnosis includes electrolyte derangement, dehydration, worsening metastatic disease.  Will check labs, give fluids, Zofran for nausea and consult Heme-Onc.    Labs notable for worsening hypercalcemia with calcium of 12.9.  Ionized calcium elevated to 1.51.  Will continue hydration and admit to Heme-Onc for further management.  Patient comfortable with admission.  I discussed this patient with my supervising physician.    Sienna Stark PA-C           Attending Attestation:     Physician Attestation Statement for NP/PA:   I discussed this assessment and plan of this patient with the NP/PA, but I did not personally examine the patient. The face to face encounter was performed by the NP/PA.    Other NP/PA Attestation Additions:    History of Present Illness: 48F with PMH breast ca with brain, liver, spine mets, now with increasing weakness and found to have hypercalcemia on outpt labs.     Medical Decision Making: Attending Note:  Physician Attestation Statement: . As the supervising MD I agree with the above treatment, course, plan, and disposition.                       Clinical Impression:       ICD-10-CM ICD-9-CM   1. Hypercalcemia of malignancy E83.52 275.42   2. Fatigue R53.83 780.79   3. Malignant neoplasm of breast, stage 4, unspecified laterality C50.919 174.9         Disposition:   Disposition: Admitted                        Sienna Stark PA-C  05/28/19 210       Umm Clarke MD  05/30/19 7079

## 2019-05-29 NOTE — H&P
[facility]  Hematology/Oncology  H&P    Patient Name: Nani Mckeon  MRN: [id number]  Admission Date: 5/28/2019  Code Status: Full Code   Attending Provider: Umm Clarke MD  Primary Care Physician: Conrad Syed III, MD  Principal Problem:Hypercalcemia of malignancy    Subjective:     HPI: This is Ms. Nani Mckeon, 48 year old female with medical history of Stage IV Breast Cancer (following with [facility]) ER positive, OR negative, HER2 negative with mets to liver, bone and brain. She presented to progressive weakness, failure to thrive, bone pain and worsening balance. She endorsed compliance with her tamoxifen. She was using oxygen at home for her chronic respiratory failure with hypoxia (possible nodular mets versus her underlying pericardial effusion). She denies abdominal pain, changes in bowel movements, urinary symptoms, chest pain, fevers or leg swelling. In ED, she was at her baseline of oxygen requirement, no signs of respiratory distress, and other vitals stable.      Oncology Treatment Plan:   [No treatment plan]    Medications:  Continuous Infusions:   sodium chloride 0.9%       Scheduled Meds:   calcitonin  4 Units/kg Subcutaneous BID    piperacillin-tazobactam (ZOSYN) IVPB  4.5 g Intravenous Q8H    senna-docusate 8.6-50 mg  2 tablet Oral BID    sertraline  50 mg Oral QHS    zoledronic acid (ZOMETA) IVPB  4 mg Intravenous Once     PRN Meds:dextrose 50%, dextrose 50%, glucagon (human recombinant), glucose, glucose, oxyCODONE, sodium chloride 0.9%     Review of patient's allergies indicates:   Allergen Reactions    Codeine     Norco [hydrocodone-acetaminophen] Nausea And Vomiting        Past Medical History:   Diagnosis Date    Breast cancer      Past Surgical History:   Procedure Laterality Date    BREAST SURGERY      CARDIAC CATHETERIZATION  12/14/2015    no stents    NASAL SINUS SURGERY      Pericardiocentesis- # 388 A N/A  4/30/2019    [facility]    TONSILLECTOMY       Family History     None        Tobacco Use    Smoking status: Never Smoker   Substance and Sexual Activity    Alcohol use: Yes     Alcohol/week: 3.0 oz     Types: 5 Glasses of wine per week     Comment: few times a week    Drug use: Never    Sexual activity: Yes     Birth control/protection: Post-menopausal       Review of Systems   Constitutional: Positive for activity change, appetite change and fatigue. Negative for fever.   HENT: Negative for congestion, dental problem and facial swelling.    Eyes: Negative for discharge.   Respiratory: Positive for shortness of breath. Negative for chest tightness and wheezing.    Cardiovascular: Negative for chest pain and leg swelling.   Gastrointestinal: Negative for abdominal distention and abdominal pain.   Genitourinary: Negative for difficulty urinating and dyspareunia.   Musculoskeletal: Positive for arthralgias and back pain.   Skin: Positive for color change.   Neurological: Positive for weakness and light-headedness. Negative for numbness.   Psychiatric/Behavioral: Positive for decreased concentration. Negative for agitation.     Objective:     Vital Signs (Most Recent):  Temp: 97.3 °F (36.3 °C) (05/28/19 1833)  Pulse: 81 (05/28/19 2130)  Resp: 19 (05/28/19 2130)  BP: (!) 122/59 (05/28/19 2130)  SpO2: 99 % (05/28/19 2130) Vital Signs (24h Range):  Temp:  [97.3 °F (36.3 °C)] 97.3 °F (36.3 °C)  Pulse:  [79-83] 81  Resp:  [17-22] 19  SpO2:  [98 %-100 %] 99 %  BP: (122-139)/(59-69) 122/59     Weight: 70.3 kg (155 lb)  Body mass index is 24.28 kg/m².  Body surface area is 1.82 meters squared.      Intake/Output Summary (Last 24 hours) at 5/28/2019 2301  Last data filed at 5/28/2019 2151  Gross per 24 hour   Intake 1100 ml   Output --   Net 1100 ml       Physical Exam   Constitutional: She is oriented to person, place, and time. She appears well-developed. She appears distressed.   ECOG  Performance Status Score: 3    HENT:   Head: Normocephalic.   Eyes: Pupils are equal, round, and reactive to light. Right eye exhibits no discharge. Left eye exhibits no discharge.   Neck: Normal range of motion. No JVD present.   Cardiovascular: Normal rate and regular rhythm.   Murmur (distance heart sounds) heard.  Pulmonary/Chest: Effort normal. No respiratory distress. She has no wheezes. She has no rales.   Abdominal: Soft. She exhibits no distension. There is no tenderness.   Musculoskeletal: She exhibits no edema.   Neurological: She is alert and oriented to person, place, and time. A sensory deficit is present. GCS eye subscore is 4. GCS verbal subscore is 5. GCS motor subscore is 6.   Lethargic   Motor 3/5 in bilateral upper extremities  Not cooperative with lower extremities examination        Significant Labs:   CMP:   Recent Labs   Lab 05/28/19 1919   *   K 4.2   CL 94*   CO2 30*   GLU 99   BUN 21*   CREATININE 0.7   CALCIUM 12.9*   PROT 7.9   ALBUMIN 2.1*   BILITOT 7.6*   ALKPHOS 890*   *   *   ANIONGAP 8   EGFRNONAA >60.0   , Coagulation:   Recent Labs   Lab 05/28/19 1919   INR 1.3*   APTT 35.3*     Diagnostic Results:  I have reviewed all pertinent imaging results/findings within the past 24 hours.  I have reviewed and interpreted all pertinent imaging results/findings within the past 24 hours.    Assessment/Plan:     * Hypercalcemia of malignancy  - Most likely resulted from her underlying Stage IV Breast Cancer with multi organs metastasis   - Will treat her as severe hypercalcemia (corrected calcium > 14 mg/dL)  - Volume expansion with isotonic saline 200 cc/hr with monitoring urine output   - Add calcitonin injection 4 units/kg BID   - Concurrent administration of a zoledronic acid 4 mg once (her HyperCa is from Bone metastases from solid tumors)    Stage IV Breast Cancer with multi-organ mets   - Stage IV Breast Cancer (following with [facility]) ER  positive, VA negative, HER2 negative with mets to liver, bone and brain.  - Last MRI brain showed (5/16/2019) 7 mm right cerebellar metastasis and possible nodule of enhancement in the right frontal lobe (axial image 130, postcontrast axials).  - Currently presented with oncological emergency as HyperCa from underlying bone mets.     Transaminasemia  - Mild history of drinking ethanol, but not significant.  - Given the trend of her LFTs since the beginning of May 2019, it most likely from Stage IV breast cancer mets  - No signs of acute cholecystitis on physical examination   - Will cover broadly with Zosyn until the result of her U/S RUQ is back   - If signs of obstruction on imaging, will proceed with consult AES for intervention to prevent future risk of cholangitis     Atrial fibrillation  - Seen before with atrial fibrillation currently with appropriate ventricular response   - Will hold on Amiodarone as it could be contributing to her LFTs and T1/2 of Amio is long to be cleared quickly   - [id number]* Score for Atrial Fibrillation Stroke Risk is 2 point with associated stroke risk of 2.2% per year. * (Ref: Lip GY, Chest. 2010)  - Taking apixaban for her anticoagulation will hold pending any intervention     Anxiety and depression  - Taking sertraline as home medication  - Stable problem, no acute change, continue current medication.     Chronic hypoxemic respiratory failure  - This could be from underlying pericardial effusion or possible  metastatic disease as seen in prior CT scan (nodularity)  - Stable problem, no acute change, continue current oxygen based on her home dose    Pericardial effusion  - Underwent Pericardiocentesis 4/30, Fluid was drained, serous 325 mL fluid was drained.   - Will repeat TTE to rule out re accumulation of her Pericardial effusion  - Although she has low voltage ECG (was also low on 2015) there is no signs of muffled soft heart sounds, negative Bridgers sign (Dullness to  percussion over lower posterior left lung).     Failure to thrive syndrome, adult  - Possible contribution from underlying malignancy, HyperCa or poor appetite   - Treat underlying active conditions and pending jakob Joe MD  Hematology/Oncology  [facility]    STAFF NOTE:  Ms. Mckeon is a 48 year old female with metastatic breast cancer to liver, bone and brain, admitted with confusion related to hypercalcemia and hyperbilirubinemia  2 D ECHO with minimal pericardial effusion.  Will obtain IR para ASAP and then MRCP and AES consult accordingly. Hypercalcemia is being addressed, She received Zometa last night and is on IV fluids. Continue Zosyn. Discussed extensively with her  about the hospital course so far. Briefly discussed code status, he thinks DNR for now and will confirm in the afternoon.

## 2019-05-29 NOTE — ASSESSMENT & PLAN NOTE
49 yo F with stave IV breast cancer with mets to the liver, bone, and brain presented with confusion, weakness, and failure to thrive. Found to have hypercalcemia and hyperbilirubinemia. US did not show any biliary dilatation would argue against a dominant stricture that would be stent-table. However, recommend MRCP for further evaluation of bile ducts. This likely represents widespread disease throughout the liver.    Recommendations:  - Obtain MRCP  - Continue trending CMP

## 2019-05-29 NOTE — ED TRIAGE NOTES
"Pt. Presents to ED today with c/o generalized weakness, abd pain, chronic back pain, nausea without vomiting, and sob. Pt. Recently made discharged from [facility], stage 4 breast ca. No radiation or chemo started yet. Pt. Confused, oriented to person, place. Not situation, time.  states this has been going on for a "little while". Jaundice noted to bilateral eyes, abd distended and hard.   "

## 2019-05-29 NOTE — HPI
This is Ms. Nani Mckeon, 48 year old female with medical history of Stage IV Breast Cancer (following with [facility]) ER positive, [place] negative, HER2 negative with mets to liver, bone and brain. She presented to progressive weakness, failure to thrive, bone pain and worsening balance. She endorsed compliance with her tamoxifen. She was using oxygen at home for her chronic respiratory failure with hypoxia (possible nodular mets versus her underlying pericardial effusion). She denies abdominal pain, changes in bowel movements, urinary symptoms, chest pain, fevers or leg swelling. In ED, she was at her baseline of oxygen requirement, no signs of respiratory distress, and other vitals stable.

## 2019-05-29 NOTE — CONSULTS
[facility]  Gastroenterology  Consult Note    Patient Name: Nani Mckeon  MRN: [id number]  Admission Date: 5/28/2019  Hospital Length of Stay: 1 days  Code Status: Full Code   Attending Provider: Tawnya Velazco MD   Consulting Provider: Ej Augustin MD  Primary Care Physician: Conrad Syed III, MD  Principal Problem:Hypercalcemia of malignancy    Inpatient consult to [facility] ([facility])  Consult performed by: Ej Augustin MD  Consult ordered by: Mohan Jarvis MD        Subjective:     HPI:  This is a 49 yo F with stage IV breast cancer with metastatic disease to the liver, bone, and brain. She is followed by her oncologist at [facility]. She presented to the ED yesterday with complaints of confusion, generalized weakness, and failure to thrive. Patient found to have hypercalcemia of malignancy, and is somewhat confused on exam. Her  is at bedside. She reports back pain. She does not have any abdominal pain or prior history of liver disease. [facility] consulted for consideration of biliary stent. She was found to have an elevated TB yesterday of 7.6 which came down to 6.7 today without any intervention. Her TB was twelve days ago was 2.4. Abd US obtained showed hepatomegaly with innumerable mets, diffuse gall bladder wall thickening, and no biliary ductal dilatation or cholelithiasis.     Past Medical History:   Diagnosis Date    Breast cancer        Past Surgical History:   Procedure Laterality Date    BREAST SURGERY      CARDIAC CATHETERIZATION  12/14/2015    no stents    NASAL SINUS SURGERY      Pericardiocentesis- # 388 A N/A 4/30/2019    [facility]    TONSILLECTOMY         Review of patient's allergies indicates:   Allergen Reactions    Codeine     Norco [hydrocodone-acetaminophen] Nausea And Vomiting     Family History     None        Tobacco Use    Smoking status: Never Smoker   Substance and Sexual Activity    Alcohol  use: Yes     Alcohol/week: 3.0 oz     Types: 5 Glasses of wine per week     Comment: few times a week    Drug use: Never    Sexual activity: Yes     Birth control/protection: Post-menopausal     Review of Systems   Constitutional: Positive for activity change, appetite change, fatigue and unexpected weight change. Negative for chills and fever.   HENT: Negative for sore throat and trouble swallowing.    Respiratory: Negative for cough and shortness of breath.    Cardiovascular: Negative for chest pain and leg swelling.   Gastrointestinal: Positive for abdominal distention and nausea. Negative for abdominal pain, constipation, diarrhea and vomiting.   Genitourinary: Negative for decreased urine volume and difficulty urinating.   Musculoskeletal: Positive for back pain. Negative for arthralgias.   Skin: Negative for color change and pallor.   Neurological: Negative for dizziness and light-headedness.   Psychiatric/Behavioral: Positive for confusion. Negative for agitation.     Objective:     Vital Signs (Most Recent):  Temp: 97.8 °F (36.6 °C) (05/29/19 1110)  Pulse: 87 (05/29/19 1118)  Resp: 18 (05/29/19 1110)  BP: 122/63 (05/29/19 1110)  SpO2: 97 % (05/29/19 1110) Vital Signs (24h Range):  Temp:  [97.3 °F (36.3 °C)-98.3 °F (36.8 °C)] 97.8 °F (36.6 °C)  Pulse:  [79-96] 87  Resp:  [17-22] 18  SpO2:  [93 %-100 %] 97 %  BP: (115-144)/(57-70) 122/63     Weight: 74.4 kg (164 lb) (05/29/19 0736)  Body mass index is 25.69 kg/m².      Intake/Output Summary (Last 24 hours) at 5/29/2019 1352  Last data filed at 5/29/2019 1320  Gross per 24 hour   Intake 3695 ml   Output 300 ml   Net 3395 ml       Lines/Drains/Airways     Drain                 Drain/Device  04/30/19 Right chest 29 days          Peripheral Intravenous Line                 Peripheral IV - Single Lumen 05/28/19 1920 20 G Left Antecubital less than 1 day                Physical Exam   Constitutional: She is oriented to person, place, and time.   Appears tired, but  nontoxic   HENT:   Mouth/Throat: Oropharynx is clear and moist.   Eyes: Scleral icterus is present.   Cardiovascular: Normal rate and regular rhythm.   Pulmonary/Chest: Effort normal and breath sounds normal.   Abdominal: Soft. She exhibits no mass. There is no tenderness. There is no guarding.   Musculoskeletal: She exhibits no edema or deformity.   Neurological: She is alert and oriented to person, place, and time.   Slow to answer questions   Skin: Skin is warm and dry.   Psychiatric: She has a normal mood and affect.   Vitals reviewed.      Significant Labs:  CBC:   Recent Labs   Lab 05/28/19  1919 05/29/19  0401   WBC 13.36* 13.88*   HGB 13.5 12.2   HCT 43.8 40.3    178     CMP:   Recent Labs   Lab 05/29/19  0401   GLU 62*   CALCIUM 11.9*   ALBUMIN 1.8*   PROT 6.7   *   K 4.2   CO2 26      BUN 19   CREATININE 0.6   ALKPHOS 753*   *   *   BILITOT 6.7*     Coagulation:   Recent Labs   Lab 05/29/19  0401   INR 1.4*   APTT 37.5*           Assessment/Plan:     Hyperbilirubinemia  47 yo F with stave IV breast cancer with mets to the liver, bone, and brain presented with confusion, weakness, and failure to thrive. Found to have hypercalcemia and hyperbilirubinemia. US did not show any biliary dilatation would argue against a dominant stricture that would be stent-table. However, recommend MRCP for further evaluation of bile ducts. This likely represents widespread disease throughout the liver.    Recommendations:  - Obtain MRCP  - Continue trending CMP        Thank you for your consult. I will follow-up with patient. Please contact us if you have any additional questions.    Ej Augustin MD  Gastroenterology  [facility]

## 2019-05-29 NOTE — ASSESSMENT & PLAN NOTE
- Mild history of drinking ethanol, but not significant.  - Given the trend of her LFTs since the beginning of May 2019, it most likely from Stage IV breast cancer mets  - No signs of acute cholecystitis on physical examination   - Will cover broadly with Zosyn until the result of her U/S RUQ is back   - If signs of obstruction on imaging, will proceed with consult AES for intervention to prevent future risk of cholangitis

## 2019-05-29 NOTE — PROGRESS NOTES
Pt arrived to MRI confused and unable to answer questions callTele and unable to tell HR due to pt not coming in on monitor / pt placed to MRI monitor and HR 99 and Sat 96% 3L N/C called floor nurse and confirmed pt mental status has been same / will send  to MRI for questions / IV fluids infusing and placed to pause for test

## 2019-05-29 NOTE — ASSESSMENT & PLAN NOTE
- Possible contribution from underlying malignancy, HyperCa or poor appetite   - Treat underlying active conditions and pending impermeant

## 2019-05-29 NOTE — PROGRESS NOTES
Pt unable to follow commands and removing coil and O2 Sat probe / attempted to re-orient pt and continue MRI

## 2019-05-29 NOTE — ASSESSMENT & PLAN NOTE
- Taking sertraline as home medication  - Stable problem, no acute change, continue current medication.

## 2019-05-29 NOTE — PROGRESS NOTES
Unable to maintain Sat reading pt not following commands and removing probe / nurse informed upon pt arrival to MRI and confirms this is current base line

## 2019-05-29 NOTE — HPI
This is a 49 yo F with stage IV breast cancer with metastatic disease to the liver, bone, and brain. She is followed by her oncologist at [facility]. She presented to the ED yesterday with complaints of confusion, generalized weakness, and failure to thrive. Patient found to have hypercalcemia of malignancy, and is somewhat confused on exam. Her  is at bedside. She reports back pain. She does not have any abdominal pain or prior history of liver disease. AES consulted for consideration of biliary stent. She was found to have an elevated TB yesterday of 7.6 which came down to 6.7 today without any intervention. Her TB was twelve days ago was 2.4. Abd US obtained showed hepatomegaly with innumerable mets, diffuse gall bladder wall thickening, and no biliary ductal dilatation or cholelithiasis.

## 2019-05-29 NOTE — SUBJECTIVE & OBJECTIVE
Past Medical History:   Diagnosis Date    Breast cancer        Past Surgical History:   Procedure Laterality Date    BREAST SURGERY      CARDIAC CATHETERIZATION  12/14/2015    no stents    NASAL SINUS SURGERY      Pericardiocentesis-RM # 388 A N/A 4/30/2019    [facility]    TONSILLECTOMY         Review of patient's allergies indicates:   Allergen Reactions    Codeine     Norco [hydrocodone-acetaminophen] Nausea And Vomiting     Family History     None        Tobacco Use    Smoking status: Never Smoker   Substance and Sexual Activity    Alcohol use: Yes     Alcohol/week: 3.0 oz     Types: 5 Glasses of wine per week     Comment: few times a week    Drug use: Never    Sexual activity: Yes     Birth control/protection: Post-menopausal     Review of Systems   Constitutional: Positive for activity change, appetite change, fatigue and unexpected weight change. Negative for chills and fever.   HENT: Negative for sore throat and trouble swallowing.    Respiratory: Negative for cough and shortness of breath.    Cardiovascular: Negative for chest pain and leg swelling.   Gastrointestinal: Positive for abdominal distention and nausea. Negative for abdominal pain, constipation, diarrhea and vomiting.   Genitourinary: Negative for decreased urine volume and difficulty urinating.   Musculoskeletal: Positive for back pain. Negative for arthralgias.   Skin: Negative for color change and pallor.   Neurological: Negative for dizziness and light-headedness.   Psychiatric/Behavioral: Positive for confusion. Negative for agitation.     Objective:     Vital Signs (Most Recent):  Temp: 97.8 °F (36.6 °C) (05/29/19 1110)  Pulse: 87 (05/29/19 1118)  Resp: 18 (05/29/19 1110)  BP: 122/63 (05/29/19 1110)  SpO2: 97 % (05/29/19 1110) Vital Signs (24h Range):  Temp:  [97.3 °F (36.3 °C)-98.3 °F (36.8 °C)] 97.8 °F (36.6 °C)  Pulse:  [79-96] 87  Resp:  [17-22] 18  SpO2:  [93 %-100 %] 97 %  BP:  (115-144)/(57-70) 122/63     Weight: 74.4 kg (164 lb) (05/29/19 0736)  Body mass index is 25.69 kg/m².      Intake/Output Summary (Last 24 hours) at 5/29/2019 1352  Last data filed at 5/29/2019 1320  Gross per 24 hour   Intake 3695 ml   Output 300 ml   Net 3395 ml       Lines/Drains/Airways     Drain                 Drain/Device  04/30/19 Right chest 29 days          Peripheral Intravenous Line                 Peripheral IV - Single Lumen 05/28/19 1920 20 G Left Antecubital less than 1 day                Physical Exam   Constitutional: She is oriented to person, place, and time.   Appears tired, but nontoxic   HENT:   Mouth/Throat: Oropharynx is clear and moist.   Eyes: Scleral icterus is present.   Cardiovascular: Normal rate and regular rhythm.   Pulmonary/Chest: Effort normal and breath sounds normal.   Abdominal: Soft. She exhibits no mass. There is no tenderness. There is no guarding.   Musculoskeletal: She exhibits no edema or deformity.   Neurological: She is alert and oriented to person, place, and time.   Slow to answer questions   Skin: Skin is warm and dry.   Psychiatric: She has a normal mood and affect.   Vitals reviewed.      Significant Labs:  CBC:   Recent Labs   Lab 05/28/19  1919 05/29/19  0401   WBC 13.36* 13.88*   HGB 13.5 12.2   HCT 43.8 40.3    178     CMP:   Recent Labs   Lab 05/29/19  0401   GLU 62*   CALCIUM 11.9*   ALBUMIN 1.8*   PROT 6.7   *   K 4.2   CO2 26      BUN 19   CREATININE 0.6   ALKPHOS 753*   *   *   BILITOT 6.7*     Coagulation:   Recent Labs   Lab 05/29/19  0401   INR 1.4*   APTT 37.5*

## 2019-05-29 NOTE — ASSESSMENT & PLAN NOTE
- Stage IV Breast Cancer (following with [facility]) ER positive, ID negative, HER2 negative with mets to liver, bone and brain.  - Last MRI brain showed (5/16/2019) 7 mm right cerebellar metastasis and possible nodule of enhancement in the right frontal lobe (axial image 130, postcontrast axials).  - Currently presented with oncological emergency as HyperCa from underlying bone mets.

## 2019-05-29 NOTE — ASSESSMENT & PLAN NOTE
- Most likely resulted from her underlying Stage IV Breast Cancer with multi organs metastasis   - Will treat her as severe hypercalcemia (corrected calcium > 14 mg/dL)  - Volume expansion with isotonic saline 200 cc/hr with monitoring urine output   - Add calcitonin injection 4 units/kg BID   - Concurrent administration of a zoledronic acid 4 mg once (her HyperCa is from Bone metastases from solid tumors)

## 2019-05-29 NOTE — PROGRESS NOTES
Pt returned to MRI table and monitor after medication given / continue O2 3L N/C / noted many unifocal PVC's / patients nurse informed while in MRI

## 2019-05-29 NOTE — PLAN OF CARE
MDR's with Dr Velazco.  Patient followed by MD on the [facility] for metastatic breast cancer.  On home O2.  Admitted through the ED with FTT, confusion and weakness.  Found to be hypercalcemic.  IVF, calcitonin and Zometa ordered.  Bili and LFT's also high.  [facility] consulted.  Pan scans and MRI brain/MRCP ordered.  Planning to potentially start chemo IP tomorrow.  CM will continue to follow for needs.

## 2019-05-29 NOTE — ASSESSMENT & PLAN NOTE
- Seen before with atrial fibrillation currently with appropriate ventricular response   - Will hold on Amiodarone as it could be contributing to her LFTs and T1/2 of Amio is long to be cleared quickly   - [id number]* Score for Atrial Fibrillation Stroke Risk is 2 point with associated stroke risk of 2.2% per year. * (Ref: Lip GY, Chest. 2010)  - Taking apixaban for her anticoagulation will hold pending any intervention

## 2019-05-29 NOTE — ED NOTES
Pt. Placed on portable tele monitor, [id number]. Taken to US with transport. No distress noted. Family remains at bs.

## 2019-05-29 NOTE — NURSING
Report to ALDO Zamora [id number].  Pt tolerated paracentesis without distress.  Bandaid at LLQ abdomen with bandaid CDI.

## 2019-05-29 NOTE — ASSESSMENT & PLAN NOTE
- Underwent Pericardiocentesis 4/30, Fluid was drained, serous 325 mL fluid was drained.   - Will repeat TTE to rule out re accumulation of her Pericardial effusion  - Although she has low voltage ECG (was also low on 2015) there is no signs of muffled soft heart sounds, negative Bridgers sign (Dullness to percussion over lower posterior left lung).

## 2019-05-30 PROBLEM — C50.919: Status: ACTIVE | Noted: 2019-01-01

## 2019-05-30 PROBLEM — R18.0 MALIGNANT ASCITES: Status: ACTIVE | Noted: 2019-01-01

## 2019-05-30 NOTE — ASSESSMENT & PLAN NOTE
Pt underwent paracentesis with IR 5/29 with 2400cc removed. Given that patient is considering hospice with a life expectancy of weeks, will reach out to IR to have patient evaluated for possible abdominal pleurX catheter.

## 2019-05-30 NOTE — PLAN OF CARE
Problem: Adult Inpatient Plan of Care  Goal: Plan of Care Review  Outcome: Ongoing (interventions implemented as appropriate)  POC reviewed with patient; understanding verbalized. Pt disoriented to place and time; confused. Tele maintained; NSR. Code status: DNR established this shift. Pt to Paracentesis and MRI this shift. Valium administered. Zosyn continued. Pt maintained NPO. 2 BMs this shift. Voided in bedside commode. Pt. with nonskid footwear on, bed in lowest position, and locked with bed rails up x2.  Pt. instructed to call prior to getting OOB.  Pt. has call light and personal items within reach. Patient is bedfast. VSS and afebrile this shift. All questions and concerns addressed at this time. Family at bedside. Will continue to monitor.

## 2019-05-30 NOTE — ASSESSMENT & PLAN NOTE
- Seen before with atrial fibrillation currently with appropriate ventricular response   - Will hold on Amiodarone as it could be contributing to her LFTs and T1/2 of Amio is long to be cleared quickly   - [id number]* Score for Atrial Fibrillation Stroke Risk is 2 point with associated stroke risk of 2.2% per year. * (Ref: Lip GY, Chest. 2010)  - Taking apixaban for her anticoagulation will hold pending any intervention

## 2019-05-30 NOTE — SUBJECTIVE & OBJECTIVE
Interval History: MRCP, MRI Brain, Paracentesis, CT chest completed overnight. Pt lying in bed with frequent redirection from  to stay in bed. Pt denies pain but endorses confusion.     Oncology Treatment Plan:   OP BREAST GEMCITABINE CARBOPLATIN Q3W    Medications:  Continuous Infusions:   sodium chloride 0.9% 200 mL/hr at 05/30/19 1125     Scheduled Meds:   lactulose  10 g Oral TID    piperacillin-tazobactam (ZOSYN) IVPB  4.5 g Intravenous Q8H    senna-docusate 8.6-50 mg  2 tablet Oral BID    sertraline  50 mg Oral QHS     PRN Meds:dextrose 50%, dextrose 50%, glucagon (human recombinant), glucose, glucose, ondansetron, oxyCODONE, sodium chloride 0.9%     Review of Systems   Constitutional: Positive for activity change, appetite change, fatigue and unexpected weight change. Negative for chills and fever.   HENT: Negative for sore throat and trouble swallowing.    Respiratory: Negative for cough and shortness of breath.    Cardiovascular: Negative for chest pain and leg swelling.   Gastrointestinal: Positive for abdominal distention and nausea. Negative for abdominal pain, constipation, diarrhea and vomiting.   Genitourinary: Negative for decreased urine volume and difficulty urinating.   Musculoskeletal: Negative for arthralgias and back pain.   Skin: Negative for color change and pallor.   Neurological: Negative for dizziness and light-headedness.   Psychiatric/Behavioral: Positive for confusion. Negative for agitation.     Objective:     Vital Signs (Most Recent):  Temp: 97.7 °F (36.5 °C) (05/30/19 1534)  Pulse: 90 (05/30/19 1534)  Resp: 20 (05/30/19 1133)  BP: (!) 111/56 (05/30/19 1534)  SpO2: 95 % (05/30/19 1534) Vital Signs (24h Range):  Temp:  [97.5 °F (36.4 °C)-98.3 °F (36.8 °C)] 97.7 °F (36.5 °C)  Pulse:  [] 90  Resp:  [17-22] 20  SpO2:  [95 %-100 %] 95 %  BP: (111-131)/(56-68) 111/56     Weight: 74.4 kg (164 lb)  Body mass index is 25.69 kg/m².  Body surface area is 1.88 meters  squared.      Intake/Output Summary (Last 24 hours) at 5/30/2019 1600  Last data filed at 5/30/2019 1211  Gross per 24 hour   Intake 4889.99 ml   Output 500 ml   Net 4389.99 ml       Physical Exam   Constitutional: She has a sickly appearance. No distress.   Appears tired, but nontoxic   HENT:   Mouth/Throat: Oropharynx is clear and moist.   Eyes: Scleral icterus is present.   Cardiovascular: Normal rate and regular rhythm.   Pulmonary/Chest: Effort normal and breath sounds normal.   Abdominal: Soft. She exhibits no mass. There is no tenderness. There is no guarding.   Musculoskeletal: She exhibits no edema or deformity.   Neurological: She is alert. She is disoriented. GCS eye subscore is 4. GCS verbal subscore is 4. GCS motor subscore is 6.   Skin: Skin is warm and dry. She is not diaphoretic.   Psychiatric: She has a normal mood and affect.   Poverty of thought, simple one or two word answers to questions.    Nursing note and vitals reviewed.      Significant Labs:   CBC:   Recent Labs   Lab 05/28/19 1919 05/29/19  0401 05/30/19  0740   WBC 13.36* 13.88* 18.32*   HGB 13.5 12.2 12.9   HCT 43.8 40.3 43.3    178 182   , CMP:   Recent Labs   Lab 05/28/19 1919 05/29/19  0401 05/30/19  0740   * 133* 137   K 4.2 4.2 4.2   CL 94* 100 105   CO2 30* 26 22*   GLU 99 62* 75   BUN 21* 19 26*   CREATININE 0.7 0.6 0.8   CALCIUM 12.9* 11.9* 10.3   PROT 7.9 6.7 7.2   ALBUMIN 2.1* 1.8* 1.9*   BILITOT 7.6* 6.7* 8.7*   ALKPHOS 890* 753* 792*   * 689* 908*   * 156* 194*   ANIONGAP 8 7* 10   EGFRNONAA >60.0 >60.0 >60.0   , Coagulation:   Recent Labs   Lab 05/28/19 1919 05/29/19 0401 05/30/19  0740   INR 1.3* 1.4* 1.4*   APTT 35.3* 37.5* 36.8*   ,  and All pertinent labs from the last 24 hours have been reviewed.    Diagnostic Results:  I have reviewed all pertinent imaging results/findings within the past 24 hours.   MRI MRCP Without Contrast  Narrative: EXAMINATION:  MRI ABDOMEN WITHOUT CONTRAST  MRCP    CLINICAL HISTORY:  Abnormal results of liver function studiesAbnormal liver function tests (LFTs)    TECHNIQUE:  Multiplanar multisequence images of the abdomen before and after administration of 10 mL Gadavist intravenous contrast. Additional thick slab fluid sensitive MRCP sequences were obtained with maximum intensity projection images for evaluation of the biliary system.    FINDINGS:  No intra or extrahepatic biliary ductal dilatation.  Extrahepatic common bile duct measures up to 6 mm within the supra pancreatic portion and tapers to 4 mm at the level of the ampulla.  There is mild caliber irregularity within the pancreatic portion.  No gallstones or choledocholithiasis.  Main pancreatic duct is normal caliber.  No pancreatic mass or large pancreatic pseudocyst.    Limited non-contrast evaluation of the upper abdomen demonstrates the following: Partially visualized bilateral breast implants.  Moderate size right pleural effusion.  Left lung bases unremarkable.  There is pectus excavatum.  Hepatomegaly with diffuse abnormal signal intensity and areas of restricted diffusion replacing the majority of the hepatic parenchyma.  Spleen and adrenal glands unremarkable.  1.6 cm circumscribed T2 hyperintensity in the left kidney likely represents a cyst.  No hydronephrosis.  No mesenteric mass.  Mild mesenteric edema and trace scattered intraperitoneal free fluid.  Moderate body wall edema.  Visualized loops of small large bowel demonstrate no evidence of obstruction or inflammation.  No pathologic lymphadenopathy identified.  S-shaped scoliosis and heterogenous diffusion restriction throughout the visualized axial skeleton.  Impression: Mild caliber irregularity within the pancreatic portion of the common bile duct may represent mild stricture.  No upstream intra or extrahepatic biliary ductal dilatation. Further assessment with ERCP as clinically warranted.    Hepatomegaly with diffuse abnormal signal  intensity and areas of restricted diffusion replacing the majority of the hepatic parenchyma compatible with biopsy proven infiltrating metastatic disease.  Component of superimposed hepatovenoocclusive disease cannot be excluded.    Patchy diffusion restriction throughout the visualized axial skeleton compatible with known osseous metastatic disease.    Moderate right pleural effusion, trace ascites and moderate anasarca.    This report was flagged in Epic as abnormal.    Electronically signed by resident: Juan Polanco  Date:    05/30/2019  Time:    10:21    Electronically signed by: Jonathon Meyer MD  Date:    05/30/2019  Time:    12:25  CT Chest With Contrast  Narrative: EXAMINATION:  CT CHEST WITH CONTRAST    CLINICAL HISTORY:  Stage 4 breast cancer; Malignant neoplasm of unspecified site of unspecified female breast    TECHNIQUE:  Low dose axial images, sagittal and coronal reformations were obtained from the thoracic inlet to the lung bases following the IV administration of 75 mL of Omnipaque 350.    COMPARISON:  CT chest non coronary 04/27/2019; CT chest with contrast 03/29/2018    FINDINGS:  THORACIC CONFIGURATION:    Significant thoracic dextroscoliosis and pectus excavatum resulting in considerable thoracic anatomic distortion.    BASE OF NECK:    Soft tissue structures at the base of the neck are unremarkable.  Thyroid gland is normal in symmetric.    EXTRATHORACIC SOFT TISSUES:    Extra thoracic soft tissues demonstrate bilateral mastectomy with breast reconstruction including breast implants.    AORTA AND CORONARY ARTERIES:    Three vessel left-sided aortic arch.  No evidence of aneurysm or significant atherosclerosis.  There is right ventricular hypertrophy with trace pericardial effusion and displaced left lateral midline.  No significant coronary artery atherosclerosis.    LYMPH NODES:    Enlarged right axillary node measuring 1.3 cm in short axis.  Scattered normal appearing lymph nodes in the  mediastinum and joie.    PULMONARY ARTERIES AND VEINS:    Pulmonary arteries distribute normally.  Questionable prominence of the pulmonary trunk and pulmonary arteries, as such pulmonary artery hypertension cannot be excluded.  There are 4 pulmonary veins.    AIRWAYS AND LUNGS:    The trachea and proximal airways are patent.  Increased lucency in the right lower lobe reflecting stable hyperexpansion, with interval development of moderate volume right-sided pleural fluid.  Stable mixed density linear consolidation along the right major fissure could reflect subsegmental atelectasis, scarring, or sequelae of inspissated secretions.    PLEURA:    New dependent right pleural fluid in this patient with a history of metastatic breast carcinoma.  We cannot exclude metastatic disease as the origin of this fluid.    No pneumothorax or new focal opacification identified.    ESOPHAGUS AND UPPER ABDOMEN:    The esophagus is normal in contour the tortuous in course in keeping with aforementioned scoliotic curvature of the thoracic spine.  Partially visualized upper abdominal structures reveal mild volume peritoneal fluid neural thighs unremarkable.    BONES:    -Osseous structures demonstrate age advanced degenerative changes.    -Extensive lytic and blastic bone metastases, relatively stable compared to chest CT of 03/29/2019 although progressed from chest CT of 10/05/2018..    -Subacute fractures involving the lateral aspect of the right 5th and 7th ribs, with remote fracture of the right 8th rib.  Impression: 1.  This patient reported clinical history of metastatic breast cancer, redemonstration of mixed density linear consolidation along the right major fissure which could reflect atelectasis, scarring, or sequelae of inspissated secretions, with metastatic disease not definitively excluded in this patient with reported clinical history of metastatic breast cancer.  This lesion can be followed with expected oncologic  surveillance.    2.  Numerous lytic lesions identified throughout the visualized axial skeleton, stable compared to CT chest of 03/29/2019, though new from CT chest of 10/05/2018, concerning for metastatic disease.  Subacute pathologic fractures involving the lateral aspect of the right 5th and 7th ribs.    3.  Interval development of moderate volume right-sided pleural effusion.  Clinical significance is unclear in this patient with a history of metastatic breast carcinoma.  If assessment for possible pleural metastases is warranted, please obtain chest CT with intravenous contrast medium in the late systemic arterial phase of enhancement in order to opacify structures supplied by intercostal arteries.    4.  Significant thoracic dextroscoliosis and pectus excavatum.  Leftward displacement of the heart is a function of the patient's scoliosis and pectus excavatum.    5.  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    6.  Right ventricular chamber dilatation, unchanged.  Elevation of pulmonary artery pressures is not excluded.    7.  Enlarged right axillary lymph node measuring up to 1.3 cm in short axis.    This report was flagged in Epic as abnormal.    Electronically signed by resident: Mando Rios MD  Date:    05/30/2019  Time:    07:53    Electronically signed by: Paula Hartley MD  Date:    05/30/2019  Time:    09:44  MRI Brain W WO Contrast  Narrative: EXAMINATION:  MRI BRAIN W WO CONTRAST    CLINICAL HISTORY:  concern for worsening mets (Breast Cancer Stage IV);.    TECHNIQUE:  Multiplanar multisequence MR imaging of the brain was performed before and after the administration of 8 mL Gadavist  intravenous contrast.    COMPARISON:  Correlation is made to MRI brain 05/24/2019    FINDINGS:  There is a 0.6 cm focus of abnormal postcontrast enhancement in the right cerebellum concerning for small intracranial metastatic focus in this  patient with reported breast cancer.  There is an additional subtle 0.3 cm focus of enhancement within the left cerebellum (axial 3D MPRAGE image 43), 0.5 cm focus of abnormal enhancement within the posterior right frontal lobe, 0.7 cm region of enhancement in the right frontal lobe with two additional subtle punctate foci of more inferiorly (axial T1 post-contrast images 19-21), concerning for additional small foci of intracranial metastatic disease.  These lesions demonstrate slight associated T2/FLAIR signal hyperintensity.  There are multiple enhancing calvarial lesions concerning for osseous metastatic disease.    There is no abnormal restricted diffusion to suggest acute infarction. The ventricles are normal in size and configuration without evidence for hydrocephalus. There are no abnormal areas of gradient susceptibility to suggest parenchymal hemorrhage.  There are additional foci of T2/FLAIR signal hyperintensity involving the deep cerebral and periventricular white matter without corresponding restricted diffusion or postcontrast enhancement.  While nonspecific, findings likely reflect sequela of chronic microvascular ischemic change.  No extra-axial hemorrhage or fluid collections.  The craniocervical junction and sellar regions are within normal limits.  Impression: 1. Small enhancing intra-axial lesions largest of which are within the right frontal lobe and right cerebellum concerning for intracranial metastatic disease as detailed above.  No associated hydrocephalus or midline shift.  2. Multiple enhancing calvarial osseous metastatic lesions.  3. No evidence of acute infarction or other acute intracranial abnormality.    Electronically signed by: Angela King MD  Date:    05/30/2019  Time:    03:25

## 2019-05-30 NOTE — PROGRESS NOTES
[facility]  Hematology/Oncology  Progress Note    Patient Name: Nani Mckeon  Admission Date: 5/28/2019  Hospital Length of Stay: 2 days  Code Status: DNR     Subjective:     HPI:  This is Ms. Nani Mckeon, 48 year old female with medical history of Stage IV Breast Cancer (following with [facility]) ER positive, MS negative, HER2 negative with mets to liver, bone and brain. She presented to progressive weakness, failure to thrive, bone pain and worsening balance. She endorsed compliance with her tamoxifen. She was using oxygen at home for her chronic respiratory failure with hypoxia (possible nodular mets versus her underlying pericardial effusion). She denies abdominal pain, changes in bowel movements, urinary symptoms, chest pain, fevers or leg swelling. In ED, she was at her baseline of oxygen requirement, no signs of respiratory distress, and other vitals stable.     Interval History: MRCP, MRI Brain, Paracentesis, CT chest completed overnight. Pt lying in bed with frequent redirection from  to stay in bed. Pt denies pain but endorses confusion.     Oncology Treatment Plan:   OP BREAST GEMCITABINE CARBOPLATIN Q3W    Medications:  Continuous Infusions:   sodium chloride 0.9% 200 mL/hr at 05/30/19 1125     Scheduled Meds:   lactulose  10 g Oral TID    piperacillin-tazobactam (ZOSYN) IVPB  4.5 g Intravenous Q8H    senna-docusate 8.6-50 mg  2 tablet Oral BID    sertraline  50 mg Oral QHS     PRN Meds:dextrose 50%, dextrose 50%, glucagon (human recombinant), glucose, glucose, ondansetron, oxyCODONE, sodium chloride 0.9%     Review of Systems   Constitutional: Positive for activity change, appetite change, fatigue and unexpected weight change. Negative for chills and fever.   HENT: Negative for sore throat and trouble swallowing.    Respiratory: Negative for cough and shortness of breath.    Cardiovascular: Negative for chest pain and leg swelling.    Gastrointestinal: Positive for abdominal distention and nausea. Negative for abdominal pain, constipation, diarrhea and vomiting.   Genitourinary: Negative for decreased urine volume and difficulty urinating.   Musculoskeletal: Negative for arthralgias and back pain.   Skin: Negative for color change and pallor.   Neurological: Negative for dizziness and light-headedness.   Psychiatric/Behavioral: Positive for confusion. Negative for agitation.     Objective:     Vital Signs (Most Recent):  Temp: 97.7 °F (36.5 °C) (05/30/19 1534)  Pulse: 90 (05/30/19 1534)  Resp: 20 (05/30/19 1133)  BP: (!) 111/56 (05/30/19 1534)  SpO2: 95 % (05/30/19 1534) Vital Signs (24h Range):  Temp:  [97.5 °F (36.4 °C)-98.3 °F (36.8 °C)] 97.7 °F (36.5 °C)  Pulse:  [] 90  Resp:  [17-22] 20  SpO2:  [95 %-100 %] 95 %  BP: (111-131)/(56-68) 111/56     Weight: 74.4 kg (164 lb)  Body mass index is 25.69 kg/m².  Body surface area is 1.88 meters squared.      Intake/Output Summary (Last 24 hours) at 5/30/2019 1600  Last data filed at 5/30/2019 1211  Gross per 24 hour   Intake 4889.99 ml   Output 500 ml   Net 4389.99 ml       Physical Exam   Constitutional: She has a sickly appearance. No distress.   Appears tired, but nontoxic   HENT:   Mouth/Throat: Oropharynx is clear and moist.   Eyes: Scleral icterus is present.   Cardiovascular: Normal rate and regular rhythm.   Pulmonary/Chest: Effort normal and breath sounds normal.   Abdominal: Soft. She exhibits no mass. There is no tenderness. There is no guarding.   Musculoskeletal: She exhibits no edema or deformity.   Neurological: She is alert. She is disoriented. GCS eye subscore is 4. GCS verbal subscore is 4. GCS motor subscore is 6.   Skin: Skin is warm and dry. She is not diaphoretic.   Psychiatric: She has a normal mood and affect.   Poverty of thought, simple one or two word answers to questions.    Nursing note and vitals reviewed.      Significant Labs:   CBC:   Recent Labs   Lab  05/28/19 1919 05/29/19 0401 05/30/19  0740   WBC 13.36* 13.88* 18.32*   HGB 13.5 12.2 12.9   HCT 43.8 40.3 43.3    178 182   , CMP:   Recent Labs   Lab 05/28/19 1919 05/29/19  0401 05/30/19  0740   * 133* 137   K 4.2 4.2 4.2   CL 94* 100 105   CO2 30* 26 22*   GLU 99 62* 75   BUN 21* 19 26*   CREATININE 0.7 0.6 0.8   CALCIUM 12.9* 11.9* 10.3   PROT 7.9 6.7 7.2   ALBUMIN 2.1* 1.8* 1.9*   BILITOT 7.6* 6.7* 8.7*   ALKPHOS 890* 753* 792*   * 689* 908*   * 156* 194*   ANIONGAP 8 7* 10   EGFRNONAA >60.0 >60.0 >60.0   , Coagulation:   Recent Labs   Lab 05/28/19 1919 05/29/19 0401 05/30/19  0740   INR 1.3* 1.4* 1.4*   APTT 35.3* 37.5* 36.8*   ,  and All pertinent labs from the last 24 hours have been reviewed.    Diagnostic Results:  I have reviewed all pertinent imaging results/findings within the past 24 hours.   MRI MRCP Without Contrast  Narrative: EXAMINATION:  MRI ABDOMEN WITHOUT CONTRAST MRCP    CLINICAL HISTORY:  Abnormal results of liver function studiesAbnormal liver function tests (LFTs)    TECHNIQUE:  Multiplanar multisequence images of the abdomen before and after administration of 10 mL Gadavist intravenous contrast. Additional thick slab fluid sensitive MRCP sequences were obtained with maximum intensity projection images for evaluation of the biliary system.    FINDINGS:  No intra or extrahepatic biliary ductal dilatation.  Extrahepatic common bile duct measures up to 6 mm within the supra pancreatic portion and tapers to 4 mm at the level of the ampulla.  There is mild caliber irregularity within the pancreatic portion.  No gallstones or choledocholithiasis.  Main pancreatic duct is normal caliber.  No pancreatic mass or large pancreatic pseudocyst.    Limited non-contrast evaluation of the upper abdomen demonstrates the following: Partially visualized bilateral breast implants.  Moderate size right pleural effusion.  Left lung bases unremarkable.  There is pectus excavatum.   Hepatomegaly with diffuse abnormal signal intensity and areas of restricted diffusion replacing the majority of the hepatic parenchyma.  Spleen and adrenal glands unremarkable.  1.6 cm circumscribed T2 hyperintensity in the left kidney likely represents a cyst.  No hydronephrosis.  No mesenteric mass.  Mild mesenteric edema and trace scattered intraperitoneal free fluid.  Moderate body wall edema.  Visualized loops of small large bowel demonstrate no evidence of obstruction or inflammation.  No pathologic lymphadenopathy identified.  S-shaped scoliosis and heterogenous diffusion restriction throughout the visualized axial skeleton.  Impression: Mild caliber irregularity within the pancreatic portion of the common bile duct may represent mild stricture.  No upstream intra or extrahepatic biliary ductal dilatation. Further assessment with ERCP as clinically warranted.    Hepatomegaly with diffuse abnormal signal intensity and areas of restricted diffusion replacing the majority of the hepatic parenchyma compatible with biopsy proven infiltrating metastatic disease.  Component of superimposed hepatovenoocclusive disease cannot be excluded.    Patchy diffusion restriction throughout the visualized axial skeleton compatible with known osseous metastatic disease.    Moderate right pleural effusion, trace ascites and moderate anasarca.    This report was flagged in Epic as abnormal.    Electronically signed by resident: Juan Polanco  Date:    05/30/2019  Time:    10:21    Electronically signed by: Jonathon Meyer MD  Date:    05/30/2019  Time:    12:25  CT Chest With Contrast  Narrative: EXAMINATION:  CT CHEST WITH CONTRAST    CLINICAL HISTORY:  Stage 4 breast cancer; Malignant neoplasm of unspecified site of unspecified female breast    TECHNIQUE:  Low dose axial images, sagittal and coronal reformations were obtained from the thoracic inlet to the lung bases following the IV administration of 75 mL of Omnipaque  350.    COMPARISON:  CT chest non coronary 04/27/2019; CT chest with contrast 03/29/2018    FINDINGS:  THORACIC CONFIGURATION:    Significant thoracic dextroscoliosis and pectus excavatum resulting in considerable thoracic anatomic distortion.    BASE OF NECK:    Soft tissue structures at the base of the neck are unremarkable.  Thyroid gland is normal in symmetric.    EXTRATHORACIC SOFT TISSUES:    Extra thoracic soft tissues demonstrate bilateral mastectomy with breast reconstruction including breast implants.    AORTA AND CORONARY ARTERIES:    Three vessel left-sided aortic arch.  No evidence of aneurysm or significant atherosclerosis.  There is right ventricular hypertrophy with trace pericardial effusion and displaced left lateral midline.  No significant coronary artery atherosclerosis.    LYMPH NODES:    Enlarged right axillary node measuring 1.3 cm in short axis.  Scattered normal appearing lymph nodes in the mediastinum and joie.    PULMONARY ARTERIES AND VEINS:    Pulmonary arteries distribute normally.  Questionable prominence of the pulmonary trunk and pulmonary arteries, as such pulmonary artery hypertension cannot be excluded.  There are 4 pulmonary veins.    AIRWAYS AND LUNGS:    The trachea and proximal airways are patent.  Increased lucency in the right lower lobe reflecting stable hyperexpansion, with interval development of moderate volume right-sided pleural fluid.  Stable mixed density linear consolidation along the right major fissure could reflect subsegmental atelectasis, scarring, or sequelae of inspissated secretions.    PLEURA:    New dependent right pleural fluid in this patient with a history of metastatic breast carcinoma.  We cannot exclude metastatic disease as the origin of this fluid.    No pneumothorax or new focal opacification identified.    ESOPHAGUS AND UPPER ABDOMEN:    The esophagus is normal in contour the tortuous in course in keeping with aforementioned scoliotic curvature  of the thoracic spine.  Partially visualized upper abdominal structures reveal mild volume peritoneal fluid neural thighs unremarkable.    BONES:    -Osseous structures demonstrate age advanced degenerative changes.    -Extensive lytic and blastic bone metastases, relatively stable compared to chest CT of 03/29/2019 although progressed from chest CT of 10/05/2018..    -Subacute fractures involving the lateral aspect of the right 5th and 7th ribs, with remote fracture of the right 8th rib.  Impression: 1.  This patient reported clinical history of metastatic breast cancer, redemonstration of mixed density linear consolidation along the right major fissure which could reflect atelectasis, scarring, or sequelae of inspissated secretions, with metastatic disease not definitively excluded in this patient with reported clinical history of metastatic breast cancer.  This lesion can be followed with expected oncologic surveillance.    2.  Numerous lytic lesions identified throughout the visualized axial skeleton, stable compared to CT chest of 03/29/2019, though new from CT chest of 10/05/2018, concerning for metastatic disease.  Subacute pathologic fractures involving the lateral aspect of the right 5th and 7th ribs.    3.  Interval development of moderate volume right-sided pleural effusion.  Clinical significance is unclear in this patient with a history of metastatic breast carcinoma.  If assessment for possible pleural metastases is warranted, please obtain chest CT with intravenous contrast medium in the late systemic arterial phase of enhancement in order to opacify structures supplied by intercostal arteries.    4.  Significant thoracic dextroscoliosis and pectus excavatum.  Leftward displacement of the heart is a function of the patient's scoliosis and pectus excavatum.    5.  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    6.  Right ventricular  chamber dilatation, unchanged.  Elevation of pulmonary artery pressures is not excluded.    7.  Enlarged right axillary lymph node measuring up to 1.3 cm in short axis.    This report was flagged in Epic as abnormal.    Electronically signed by resident: Mando Rios MD  Date:    05/30/2019  Time:    07:53    Electronically signed by: Paula Hartley MD  Date:    05/30/2019  Time:    09:44  MRI Brain W WO Contrast  Narrative: EXAMINATION:  MRI BRAIN W WO CONTRAST    CLINICAL HISTORY:  concern for worsening mets (Breast Cancer Stage IV);.    TECHNIQUE:  Multiplanar multisequence MR imaging of the brain was performed before and after the administration of 8 mL Gadavist  intravenous contrast.    COMPARISON:  Correlation is made to MRI brain 05/24/2019    FINDINGS:  There is a 0.6 cm focus of abnormal postcontrast enhancement in the right cerebellum concerning for small intracranial metastatic focus in this patient with reported breast cancer.  There is an additional subtle 0.3 cm focus of enhancement within the left cerebellum (axial 3D MPRAGE image 43), 0.5 cm focus of abnormal enhancement within the posterior right frontal lobe, 0.7 cm region of enhancement in the right frontal lobe with two additional subtle punctate foci of more inferiorly (axial T1 post-contrast images 19-21), concerning for additional small foci of intracranial metastatic disease.  These lesions demonstrate slight associated T2/FLAIR signal hyperintensity.  There are multiple enhancing calvarial lesions concerning for osseous metastatic disease.    There is no abnormal restricted diffusion to suggest acute infarction. The ventricles are normal in size and configuration without evidence for hydrocephalus. There are no abnormal areas of gradient susceptibility to suggest parenchymal hemorrhage.  There are additional foci of T2/FLAIR signal hyperintensity involving the deep cerebral and periventricular white matter without corresponding  restricted diffusion or postcontrast enhancement.  While nonspecific, findings likely reflect sequela of chronic microvascular ischemic change.  No extra-axial hemorrhage or fluid collections.  The craniocervical junction and sellar regions are within normal limits.  Impression: 1. Small enhancing intra-axial lesions largest of which are within the right frontal lobe and right cerebellum concerning for intracranial metastatic disease as detailed above.  No associated hydrocephalus or midline shift.  2. Multiple enhancing calvarial osseous metastatic lesions.  3. No evidence of acute infarction or other acute intracranial abnormality.    Electronically signed by: Angela King MD  Date:    05/30/2019  Time:    03:25        Assessment/Plan:     * Hypercalcemia of malignancy  - Most likely resulted from her underlying Stage IV Breast Cancer with multi organs metastasis   - Will treat her as severe hypercalcemia (corrected calcium > 14 mg/dL)  - Volume expansion with isotonic saline 200 cc/hr with monitoring urine output   - calcitonin injection 4 units/kg BID   - Concurrent administration of a zoledronic acid 4 mg once (her HyperCa is from Bone metastases from solid tumors)  - Calcium improving on above therapies    Transaminasemia  - Mild history of drinking ethanol, but not significant.  - Given the trend of her LFTs since the beginning of May 2019, it most likely from Stage IV breast cancer mets  - No signs of acute cholecystitis on physical examination   - Will cover broadly with Zosyn   - MRCP showing minimal ductal dilation but with hepatomegaly with diffuse abnormal signal intensity and areas of restricted diffusion replacing the majority of the hepatic parenchyma compatible with biopsy proven infiltrating metastatic disease. Given rapid progression of disease and a bilirubin that contraindicates chemotherapy, a discussion was had with Pt's  that chemotherapy would likely hasten this patients death without  symptom benefit.  electing to pursue hospice to allow patient to return home to be with her daughters.     Hyperbilirubinemia  As above    Stage IV Breast Cancer with multi-organ mets   - Stage IV Breast Cancer (following with [facility]) ER positive, [place] negative, HER2 negative with mets to liver, bone and brain.  - Last MRI brain showed (5/16/2019) 7 mm right cerebellar metastasis and possible nodule of enhancement in the right frontal lobe (axial image 130, postcontrast axials).  - Currently presented with oncological emergency as HyperCa from underlying bone mets.     Malignant ascites  Pt underwent paracentesis with IR 5/29 with 2400cc removed. Given that patient is considering hospice with a life expectancy of weeks, will reach out to IR to have patient evaluated for possible abdominal pleurX catheter.     Failure to thrive syndrome, adult  - Possible contribution from underlying malignancy, HyperCa or poor appetite   - Treat underlying active conditions and pending impermeant     Atrial fibrillation  - Seen before with atrial fibrillation currently with appropriate ventricular response   - Will hold on Amiodarone as it could be contributing to her LFTs and T1/2 of Amio is long to be cleared quickly   - [id number]* Score for Atrial Fibrillation Stroke Risk is 2 point with associated stroke risk of 2.2% per year. * (Ref: Lip GY, Chest. 2010)  - Taking apixaban for her anticoagulation will hold pending any intervention     Pericardial effusion  - Underwent Pericardiocentesis 4/30, Fluid was drained, serous 325 mL fluid was drained.   - Will repeat TTE to rule out re accumulation of her Pericardial effusion  - Although she has low voltage ECG (was also low on 2015) there is no signs of muffled soft heart sounds, negative Bridgers sign (Dullness to percussion over lower posterior left lung).     Chronic hypoxemic respiratory failure  - This could be from underlying pericardial  effusion or possible  metastatic disease as seen in prior CT scan (nodularity)  - Stable problem, no acute change, continue current oxygen based on her home dose    Anxiety and depression  - Taking sertraline as home medication  - Stable problem, no acute change, continue current medication.              Jeb Ruffin,   Hematology/Oncology  [facility]    STAFF NOTE:  I have personally taken the history and examined this patient and agree with residents Note as stated above   Please see my note for additional details  Reviewed MRCP results with patient and her .  definitely understands that the extent of disease in the liver is leading to liver dysfunction. There is no duct dilatation so no stent can be done.   Spoke with brother-in-law Ramo Mckeon per 's request. She is also not a candidate for surgery or RT (this was discussed per their request) Chemo in her situation will drastically lower her survival. We discussed hospice and they understand her poor prognosis and are agreeable.   Will see if IR can place a pleurex catheter for drainage of ascitic fluid. Facilitate hospice for tomorrow.

## 2019-05-30 NOTE — ASSESSMENT & PLAN NOTE
- Mild history of drinking ethanol, but not significant.  - Given the trend of her LFTs since the beginning of May 2019, it most likely from Stage IV breast cancer mets  - No signs of acute cholecystitis on physical examination   - Will cover broadly with Zosyn   - MRCP showing minimal ductal dilation but with hepatomegaly with diffuse abnormal signal intensity and areas of restricted diffusion replacing the majority of the hepatic parenchyma compatible with biopsy proven infiltrating metastatic disease. Given rapid progression of disease and a bilirubin that contraindicates chemotherapy, a discussion was had with Pt's  that chemotherapy would likely hasten this patients death without symptom benefit.  electing to pursue hospice to allow patient to return home to be with her daughters.

## 2019-05-30 NOTE — CONSULTS
Radiology Consult    Nani Mckeon is a 48 y.o. female with a history of stage 4 breast cancer. IR consulted for evaluating pleural effusion and ascitics for Pleurx catheter.        Past Medical History:   Diagnosis Date    Breast cancer      Past Surgical History:   Procedure Laterality Date    BREAST SURGERY      CARDIAC CATHETERIZATION  12/14/2015    no stents    NASAL SINUS SURGERY      Pericardiocentesis-RM # [id number] N/A 4/30/2019    Performed by V[facility] Boothe [facility] at [facility] CATH    TONSILLECTOMY         Discussed with primary team, Dr. Jeb Ruffin.     Case discussed with Radiology staff, Dr. Bauman.     Procedure: possible pleurx catheter placement.     Scheduled Meds:    lactulose  10 g Oral TID    piperacillin-tazobactam (ZOSYN) IVPB  4.5 g Intravenous Q8H    senna-docusate 8.6-50 mg  2 tablet Oral BID    sertraline  50 mg Oral QHS     Continuous Infusions:    sodium chloride 0.9% 200 mL/hr at 05/30/19 1125     PRN Meds:dextrose 50%, dextrose 50%, glucagon (human recombinant), glucose, glucose, ondansetron, oxyCODONE, sodium chloride 0.9%    Allergies:   Review of patient's allergies indicates:   Allergen Reactions    Codeine     Norco [hydrocodone-acetaminophen] Nausea And Vomiting       Labs:  Recent Labs   Lab 05/30/19  0740   INR 1.4*       Recent Labs   Lab 05/30/19  0740   WBC 18.32*   HGB 12.9   HCT 43.3   MCV 93         Recent Labs   Lab 05/28/19  1919  05/30/19  0740   GLU 99   < > 75   *   < > 137   K 4.2   < > 4.2   CL 94*   < > 105   CO2 30*   < > 22*   BUN 21*   < > 26*   CREATININE 0.7   < > 0.8   CALCIUM 12.9*   < > 10.3   MG 1.6   < > 1.8   *   < > 194*   *   < > 908*   ALBUMIN 2.1*   < > 1.9*   BILITOT 7.6*   < > 8.7*   BILIDIR 5.4*  --   --     < > = values in this interval not displayed.         Vitals (Most Recent):  Temp: 97.7 °F (36.5 °C) (05/30/19 1534)  Pulse: 90 (05/30/19 1534)  Resp: 20 (05/30/19 1133)  BP: (!) 111/56 (05/30/19  1534)  SpO2: 95 % (05/30/19 1534)    Plan:   1. NPO after midnight.  2. Hold anticoagulants.  3. Will evaluate the patient tomorrow for possible Pleurx catheter placement.     Pablito Boggs MD   PGY-2 Radiology

## 2019-05-30 NOTE — PHYSICIAN QUERY
PT Name: Nani Mckeon  MR #: [id number]    Physician Query Form - Atrial Fibrillation Specificity     CDS: Martha Miner RN  Contact information:[email or web address]     This form is a permanent document in the medical record.     Query Date: May 30, 2019    By submitting this query, we are merely seeking further clarification of documentation. Please utilize your independent clinical judgment when addressing the question(s) below.    The medical record contains the following:   Indicators     Supporting Clinical Findings Location in Medical Record   x Atrial Fibrillation Atrial fibrillation  - Seen before with atrial fibrillation currently with appropriate ventricular response   - Will hold on Amiodarone as it could be contributing to her LFTs and T1/2 of Amio is long to be cleared quickly   - [id number]* Score for Atrial Fibrillation Stroke Risk is 2 point with associated stroke risk of 2.2% per year. * (Ref: Lip GY, Chest. 2010)  - Taking apixaban for her anticoagulation will hold pending any intervention H&P 5/28    x EKG results     Normal sinus rhythm  Right axis deviation  Pulmonary disease pattern  Incomplete right bundle branch block  Right ventricular hypertrophy  Nonspecific T wave abnormality EKG 5/28    Medication      Treatment      Other         Provider, please further specify the Atrial Fibrillation diagnosis.    [  ] Chronic   [ x ] Paroxysmal   [  ] Other (please specify):   [  ] Clinically Undetermined       Please document in your progress notes daily for the duration of treatment until resolved, and include in your discharge summary.

## 2019-05-30 NOTE — ASSESSMENT & PLAN NOTE
- Stage IV Breast Cancer (following with [facility]) ER positive, [place] negative, HER2 negative with mets to liver, bone and brain.  - Last MRI brain showed (5/16/2019) 7 mm right cerebellar metastasis and possible nodule of enhancement in the right frontal lobe (axial image 130, postcontrast axials).  - Currently presented with oncological emergency as HyperCa from underlying bone mets.

## 2019-05-30 NOTE — PLAN OF CARE
Problem: Adult Inpatient Plan of Care  Goal: Plan of Care Review  Outcome: Ongoing (interventions implemented as appropriate)  POC reviewed with patient; understanding verbalized. Pt disoriented to place, time and situation. Intermittent agitation and trying to crawl out of bed. Consult to IR for pleurx catheter. Consult to  for Hospice. Tele discontinued. Zosyn continued. Lactulose administered; 6+ BMs this shift. Incontinent to urine and stool. Waffle mattress on bed and pt being turned Q2 hours. Pt. with nonskid footwear on, bed in lowest position, and locked with bed rails up x2.  Pt. instructed to call prior to getting OOB.  Pt. has call light and personal items within reach. Patient is chairfast.  VSS and afebrile this shift. All questions and concerns addressed at this time.  at bedside. Will continue to monitor.

## 2019-05-30 NOTE — ASSESSMENT & PLAN NOTE
- Most likely resulted from her underlying Stage IV Breast Cancer with multi organs metastasis   - Will treat her as severe hypercalcemia (corrected calcium > 14 mg/dL)  - Volume expansion with isotonic saline 200 cc/hr with monitoring urine output   - calcitonin injection 4 units/kg BID   - Concurrent administration of a zoledronic acid 4 mg once (her HyperCa is from Bone metastases from solid tumors)  - Calcium improving on above therapies

## 2019-05-30 NOTE — PLAN OF CARE
Problem: Adult Inpatient Plan of Care  Goal: Plan of Care Review  Outcome: Ongoing (interventions implemented as appropriate)  Plan of care reviewed with the patient at the beginning of the shift. Pt is very confused, oriented to self only. Elevated Ca levels on admit, labs today pending. IVF infusing. Elevated ammonia levels. Lactulose given, one large BM this morning. O2 at 3L nasal canula. Abdomen is very taut and distended. 2400cc removed with paracentesis yesterday. She continues to be NPO. Fall precautions maintained. Bed alarm on. telesitter requested, pt on waitlist. Pt remained free from falls and injury this shift. Bed locked in lowest position, side rails up x2, call light within reach. Instructed pt to call for assistance as needed. Family intermittently at the bedside. Vitals stable. Pt afebrile. Will continue to monitor.

## 2019-05-31 NOTE — PROGRESS NOTES
[facility]  Hematology/Oncology  Progress Note    Patient Name: Nani Mckeon  Admission Date: 5/28/2019  Hospital Length of Stay: 3 days  Code Status: DNR     Subjective:     HPI:  This is Ms. Nani Mckeon, 48 year old female with medical history of Stage IV Breast Cancer (following with [facility]) ER positive, OH negative, HER2 negative with mets to liver, bone and brain. She presented to progressive weakness, failure to thrive, bone pain and worsening balance. She endorsed compliance with her tamoxifen. She was using oxygen at home for her chronic respiratory failure with hypoxia (possible nodular mets versus her underlying pericardial effusion). She denies abdominal pain, changes in bowel movements, urinary symptoms, chest pain, fevers or leg swelling. In ED, she was at her baseline of oxygen requirement, no signs of respiratory distress, and other vitals stable.     Interval History: Pt somnolent overnight after receiving IV Dilaudid 1mg yesterday afternoon; pt more awake this AM, opens eyes spontaneously and answers to voice. Pt's  reports that their daughters will be at bedside this morning, and afterwards they would like to proceed with IR evaluation/PleurX placement.     Oncology Treatment Plan:   OP BREAST GEMCITABINE CARBOPLATIN Q3W    Medications:  Continuous Infusions:  Scheduled Meds:   lactulose  10 g Oral TID    sertraline  50 mg Oral QHS     PRN Meds:ondansetron, sodium chloride 0.9%     Review of Systems   Constitutional: Positive for activity change, appetite change, fatigue and unexpected weight change. Negative for chills and fever.   HENT: Negative for sore throat and trouble swallowing.    Respiratory: Negative for cough and shortness of breath.    Cardiovascular: Negative for chest pain and leg swelling.   Gastrointestinal: Positive for abdominal distention and nausea. Negative for abdominal pain, constipation, diarrhea and  vomiting.   Genitourinary: Negative for decreased urine volume and difficulty urinating.   Musculoskeletal: Negative for arthralgias and back pain.   Skin: Negative for color change and pallor.   Neurological: Negative for dizziness and light-headedness.   Psychiatric/Behavioral: Positive for confusion. Negative for agitation.     Objective:     Vital Signs (Most Recent):  Temp: 96.3 °F (35.7 °C) (05/31/19 0457)  Pulse: 85 (05/31/19 0457)  Resp: 14 (05/31/19 0457)  BP: (!) 111/57 (05/31/19 0457)  SpO2: 97 % (05/31/19 0457) Vital Signs (24h Range):  Temp:  [96.3 °F (35.7 °C)-98 °F (36.7 °C)] 96.3 °F (35.7 °C)  Pulse:  [81-96] 85  Resp:  [14-20] 14  SpO2:  [95 %-98 %] 97 %  BP: ()/(51-62) 111/57     Weight: 74.4 kg (164 lb)  Body mass index is 25.69 kg/m².  Body surface area is 1.88 meters squared.      Intake/Output Summary (Last 24 hours) at 5/31/2019 0809  Last data filed at 5/31/2019 0614  Gross per 24 hour   Intake 5143.33 ml   Output --   Net 5143.33 ml       Physical Exam   Constitutional: She has a sickly appearance. No distress.   Appears tired, but nontoxic   HENT:   Mouth/Throat: Oropharynx is clear and moist.   Eyes: Scleral icterus is present.   Cardiovascular: Normal rate and regular rhythm.   Pulmonary/Chest: Effort normal and breath sounds normal.   Abdominal: Soft. She exhibits no mass. There is no tenderness. There is no guarding.   Musculoskeletal: She exhibits no edema or deformity.   Neurological: She is alert. She is disoriented. GCS eye subscore is 4. GCS verbal subscore is 4. GCS motor subscore is 6.   Skin: Skin is warm and dry. She is not diaphoretic.   Psychiatric: She has a normal mood and affect.   Poverty of thought, simple one or two word answers to questions.    Nursing note and vitals reviewed.      Significant Labs:   CBC:   Recent Labs   Lab 05/30/19  0740 05/31/19  0341   WBC 18.32* 18.14*   HGB 12.9 12.3   HCT 43.3 40.7    177   , CMP:   Recent Labs   Lab  05/30/19  0740 05/31/19  0341    141   K 4.2 4.7    109   CO2 22* 20*   GLU 75 71   BUN 26* 35*   CREATININE 0.8 1.3   CALCIUM 10.3 8.9   PROT 7.2 6.5   ALBUMIN 1.9* 1.7*   BILITOT 8.7* 9.1*   ALKPHOS 792* 688*   * 1,064*   * 217*   ANIONGAP 10 12   EGFRNONAA >60.0 48.0*   , Coagulation:   Recent Labs   Lab 05/30/19  0740 05/31/19  0341   INR 1.4* 1.5*   APTT 36.8* 41.5*    and All pertinent labs from the last 24 hours have been reviewed.    Diagnostic Results:  I have reviewed all pertinent imaging results/findings within the past 24 hours.   MRI MRCP Without Contrast  Narrative: EXAMINATION:  MRI ABDOMEN WITHOUT CONTRAST MRCP    CLINICAL HISTORY:  Abnormal results of liver function studiesAbnormal liver function tests (LFTs)    TECHNIQUE:  Multiplanar multisequence images of the abdomen before and after administration of 10 mL Gadavist intravenous contrast. Additional thick slab fluid sensitive MRCP sequences were obtained with maximum intensity projection images for evaluation of the biliary system.    FINDINGS:  No intra or extrahepatic biliary ductal dilatation.  Extrahepatic common bile duct measures up to 6 mm within the supra pancreatic portion and tapers to 4 mm at the level of the ampulla.  There is mild caliber irregularity within the pancreatic portion.  No gallstones or choledocholithiasis.  Main pancreatic duct is normal caliber.  No pancreatic mass or large pancreatic pseudocyst.    Limited non-contrast evaluation of the upper abdomen demonstrates the following: Partially visualized bilateral breast implants.  Moderate size right pleural effusion.  Left lung bases unremarkable.  There is pectus excavatum.  Hepatomegaly with diffuse abnormal signal intensity and areas of restricted diffusion replacing the majority of the hepatic parenchyma.  Spleen and adrenal glands unremarkable.  1.6 cm circumscribed T2 hyperintensity in the left kidney likely represents a cyst.  No  hydronephrosis.  No mesenteric mass.  Mild mesenteric edema and trace scattered intraperitoneal free fluid.  Moderate body wall edema.  Visualized loops of small large bowel demonstrate no evidence of obstruction or inflammation.  No pathologic lymphadenopathy identified.  S-shaped scoliosis and heterogenous diffusion restriction throughout the visualized axial skeleton.  Impression: Mild caliber irregularity within the pancreatic portion of the common bile duct may represent mild stricture.  No upstream intra or extrahepatic biliary ductal dilatation. Further assessment with ERCP as clinically warranted.    Hepatomegaly with diffuse abnormal signal intensity and areas of restricted diffusion replacing the majority of the hepatic parenchyma compatible with biopsy proven infiltrating metastatic disease.  Component of superimposed hepatovenoocclusive disease cannot be excluded.    Patchy diffusion restriction throughout the visualized axial skeleton compatible with known osseous metastatic disease.    Moderate right pleural effusion, trace ascites and moderate anasarca.    This report was flagged in Epic as abnormal.    Electronically signed by resident: Juan Polanco  Date:    05/30/2019  Time:    10:21    Electronically signed by: Jonathon Meyer MD  Date:    05/30/2019  Time:    12:25  CT Chest With Contrast  Narrative: EXAMINATION:  CT CHEST WITH CONTRAST    CLINICAL HISTORY:  Stage 4 breast cancer; Malignant neoplasm of unspecified site of unspecified female breast    TECHNIQUE:  Low dose axial images, sagittal and coronal reformations were obtained from the thoracic inlet to the lung bases following the IV administration of 75 mL of Omnipaque 350.    COMPARISON:  CT chest non coronary 04/27/2019; CT chest with contrast 03/29/2018    FINDINGS:  THORACIC CONFIGURATION:    Significant thoracic dextroscoliosis and pectus excavatum resulting in considerable thoracic anatomic distortion.    BASE OF NECK:    Soft  tissue structures at the base of the neck are unremarkable.  Thyroid gland is normal in symmetric.    EXTRATHORACIC SOFT TISSUES:    Extra thoracic soft tissues demonstrate bilateral mastectomy with breast reconstruction including breast implants.    AORTA AND CORONARY ARTERIES:    Three vessel left-sided aortic arch.  No evidence of aneurysm or significant atherosclerosis.  There is right ventricular hypertrophy with trace pericardial effusion and displaced left lateral midline.  No significant coronary artery atherosclerosis.    LYMPH NODES:    Enlarged right axillary node measuring 1.3 cm in short axis.  Scattered normal appearing lymph nodes in the mediastinum and joie.    PULMONARY ARTERIES AND VEINS:    Pulmonary arteries distribute normally.  Questionable prominence of the pulmonary trunk and pulmonary arteries, as such pulmonary artery hypertension cannot be excluded.  There are 4 pulmonary veins.    AIRWAYS AND LUNGS:    The trachea and proximal airways are patent.  Increased lucency in the right lower lobe reflecting stable hyperexpansion, with interval development of moderate volume right-sided pleural fluid.  Stable mixed density linear consolidation along the right major fissure could reflect subsegmental atelectasis, scarring, or sequelae of inspissated secretions.    PLEURA:    New dependent right pleural fluid in this patient with a history of metastatic breast carcinoma.  We cannot exclude metastatic disease as the origin of this fluid.    No pneumothorax or new focal opacification identified.    ESOPHAGUS AND UPPER ABDOMEN:    The esophagus is normal in contour the tortuous in course in keeping with aforementioned scoliotic curvature of the thoracic spine.  Partially visualized upper abdominal structures reveal mild volume peritoneal fluid neural thighs unremarkable.    BONES:    -Osseous structures demonstrate age advanced degenerative changes.    -Extensive lytic and blastic bone metastases,  relatively stable compared to chest CT of 03/29/2019 although progressed from chest CT of 10/05/2018..    -Subacute fractures involving the lateral aspect of the right 5th and 7th ribs, with remote fracture of the right 8th rib.  Impression: 1.  This patient reported clinical history of metastatic breast cancer, redemonstration of mixed density linear consolidation along the right major fissure which could reflect atelectasis, scarring, or sequelae of inspissated secretions, with metastatic disease not definitively excluded in this patient with reported clinical history of metastatic breast cancer.  This lesion can be followed with expected oncologic surveillance.    2.  Numerous lytic lesions identified throughout the visualized axial skeleton, stable compared to CT chest of 03/29/2019, though new from CT chest of 10/05/2018, concerning for metastatic disease.  Subacute pathologic fractures involving the lateral aspect of the right 5th and 7th ribs.    3.  Interval development of moderate volume right-sided pleural effusion.  Clinical significance is unclear in this patient with a history of metastatic breast carcinoma.  If assessment for possible pleural metastases is warranted, please obtain chest CT with intravenous contrast medium in the late systemic arterial phase of enhancement in order to opacify structures supplied by intercostal arteries.    4.  Significant thoracic dextroscoliosis and pectus excavatum.  Leftward displacement of the heart is a function of the patient's scoliosis and pectus excavatum.    5.  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    6.  Right ventricular chamber dilatation, unchanged.  Elevation of pulmonary artery pressures is not excluded.    7.  Enlarged right axillary lymph node measuring up to 1.3 cm in short axis.    This report was flagged in Epic as abnormal.    Electronically signed by resident: Mando  MD Gabriel  Date:    05/30/2019  Time:    07:53    Electronically signed by: Paula Hartley MD  Date:    05/30/2019  Time:    09:44  MRI Brain W WO Contrast  Narrative: EXAMINATION:  MRI BRAIN W WO CONTRAST    CLINICAL HISTORY:  concern for worsening mets (Breast Cancer Stage IV);.    TECHNIQUE:  Multiplanar multisequence MR imaging of the brain was performed before and after the administration of 8 mL Gadavist  intravenous contrast.    COMPARISON:  Correlation is made to MRI brain 05/24/2019    FINDINGS:  There is a 0.6 cm focus of abnormal postcontrast enhancement in the right cerebellum concerning for small intracranial metastatic focus in this patient with reported breast cancer.  There is an additional subtle 0.3 cm focus of enhancement within the left cerebellum (axial 3D MPRAGE image 43), 0.5 cm focus of abnormal enhancement within the posterior right frontal lobe, 0.7 cm region of enhancement in the right frontal lobe with two additional subtle punctate foci of more inferiorly (axial T1 post-contrast images 19-21), concerning for additional small foci of intracranial metastatic disease.  These lesions demonstrate slight associated T2/FLAIR signal hyperintensity.  There are multiple enhancing calvarial lesions concerning for osseous metastatic disease.    There is no abnormal restricted diffusion to suggest acute infarction. The ventricles are normal in size and configuration without evidence for hydrocephalus. There are no abnormal areas of gradient susceptibility to suggest parenchymal hemorrhage.  There are additional foci of T2/FLAIR signal hyperintensity involving the deep cerebral and periventricular white matter without corresponding restricted diffusion or postcontrast enhancement.  While nonspecific, findings likely reflect sequela of chronic microvascular ischemic change.  No extra-axial hemorrhage or fluid collections.  The craniocervical junction and sellar regions are within normal  limits.  Impression: 1. Small enhancing intra-axial lesions largest of which are within the right frontal lobe and right cerebellum concerning for intracranial metastatic disease as detailed above.  No associated hydrocephalus or midline shift.  2. Multiple enhancing calvarial osseous metastatic lesions.  3. No evidence of acute infarction or other acute intracranial abnormality.    Electronically signed by: Angela King MD  Date:    05/30/2019  Time:    03:25        Assessment/Plan:     * Hypercalcemia of malignancy  - Most likely resulted from her underlying Stage IV Breast Cancer with multi organs metastasis   - Will treat her as severe hypercalcemia (corrected calcium > 14 mg/dL)  - Volume expansion with isotonic saline 200 cc/hr with monitoring urine output   - calcitonin injection 4 units/kg BID   - Concurrent administration of a zoledronic acid 4 mg once (her HyperCa is from Bone metastases from solid tumors)  - Calcium improving on above therapies    Transaminasemia  - Mild history of drinking ethanol, but not significant.  - Given the trend of her LFTs since the beginning of May 2019, it most likely from Stage IV breast cancer mets  - No signs of acute cholecystitis on physical examination   - Will cover broadly with Zosyn   - MRCP showing minimal ductal dilation but with hepatomegaly with diffuse abnormal signal intensity and areas of restricted diffusion replacing the majority of the hepatic parenchyma compatible with biopsy proven infiltrating metastatic disease. Given rapid progression of disease and a bilirubin that contraindicates chemotherapy, a discussion was had with Pt's  that chemotherapy would likely hasten this patients death without symptom benefit.  electing to pursue hospice to allow patient to return home to be with her daughters.     Hyperbilirubinemia  As above    Stage IV Breast Cancer with multi-organ mets   - Stage IV Breast Cancer ([place] with [place]  [facility]) ER positive, [place] negative, HER2 negative with mets to liver, bone and brain.  - Last MRI brain showed (5/16/2019) 7 mm right cerebellar metastasis and possible nodule of enhancement in the right frontal lobe (axial image 130, postcontrast axials).  - Currently presented with oncological emergency as HyperCa from underlying bone mets.   - Dr. Velazco, Dr. Staton, and Dr. Garcia discussed possibility of inpatient chemo, and concluded that inpatient chemo presented significant risk of decline with little expected benefit for patient.     Malignant ascites  Pt underwent paracentesis with IR 5/29 with 2400cc removed. Given that patient is considering hospice with a life expectancy of weeks, Seen at bedside by IR for PleurX catheter; will proceed with US evaluation after pt and  meet with their daughters this AM.     Failure to thrive syndrome, adult  - Possible contribution from underlying malignancy, HyperCa or poor appetite   - Treat underlying active conditions and pending impermeant     Atrial fibrillation  - Seen before with atrial fibrillation currently with appropriate ventricular response   - Will hold on Amiodarone as it could be contributing to her LFTs and T1/2 of Amio is long to be cleared quickly   - [id number]* Score for Atrial Fibrillation Stroke Risk is 2 point with associated stroke risk of 2.2% per year. * (Ref: Lip GY, Chest. 2010)  - Taking apixaban for her anticoagulation will hold pending any intervention     Pericardial effusion  - Underwent Pericardiocentesis 4/30, Fluid was drained, serous 325 mL fluid was drained.   - Will repeat TTE to rule out re accumulation of her Pericardial effusion  - Although she has low voltage ECG (was also low on 2015) there is no signs of muffled soft heart sounds, negative Bridgers sign (Dullness to percussion over lower posterior left lung).     Chronic hypoxemic respiratory failure  - This could be from underlying pericardial effusion or possible   metastatic disease as seen in prior CT scan (nodularity)  - Stable problem, no acute change, continue current oxygen based on her home dose    Anxiety and depression  - Taking sertraline as home medication  - Stable problem, no acute change, continue current medication.              Jeb Ruffin DO  Hematology/Oncology  [facility]      ATTENDING NOTE, ONCOLOGY INPATIENT TEAM    Events of last 24 hours noted.  Patient seen and examined, chart reviewed.  Appears relatively comfortable, in NAD.  Lungs with few scattered ronchi  Abdomen distended and slightly tender  Labs reviewed.    PLAN  We had a long and [place] discussion with her family.  HEr life expectancy is days only.  We will switch from dilaudid to morphine prn.  We will also d/c the lactulose at this point.  I see no reason to pursue a paracentesis given the patient's declining status.  We will follow..      Hamlet Rubio MD

## 2019-05-31 NOTE — PLAN OF CARE
Problem: Adult Inpatient Plan of Care  Goal: Plan of Care Review   Assessment completed & no alarming findings found.VS remained stable.PRN medications given for comfort as requested by family and upon assessment of discomfort. Patient remained NPO throughout shift. Voids per bed pad and changed as needed.  One BM noted today.  side rails up x2; call bell in place; bed in lowest, locked position; ; no evidence of skin breakdown; care plan explained to patients family, spouse, children, and friends. no additional complaints at this time. Will continue routine plan of care and possible discharge home on hospice.

## 2019-05-31 NOTE — ASSESSMENT & PLAN NOTE
- Seen before with atrial fibrillation currently with appropriate ventricular response   - Will hold on Amiodarone as it could be contributing to her LFTs and T1/2 of Amio is long to be cleared quickly   - [id number]* Score for Atrial Fibrillation Stroke Risk is 2 point with associated stroke risk of 2.2% per year. * (Ref: Lip GY, Chest. 2010)  - Taking apixaban for her anticoagulation will hold pending any intervention

## 2019-05-31 NOTE — SUBJECTIVE & OBJECTIVE
Interval History: Pt somnolent overnight after receiving IV Dilaudid 1mg yesterday afternoon; pt more awake this AM, opens eyes spontaneously and answers to voice. Pt's  reports that their daughters will be at bedside this morning, and afterwards they would like to proceed with IR evaluation/PleurX placement.     Oncology Treatment Plan:   OP BREAST GEMCITABINE CARBOPLATIN Q3W    Medications:  Continuous Infusions:  Scheduled Meds:   lactulose  10 g Oral TID    sertraline  50 mg Oral QHS     PRN Meds:ondansetron, sodium chloride 0.9%     Review of Systems   Constitutional: Positive for activity change, appetite change, fatigue and unexpected weight change. Negative for chills and fever.   HENT: Negative for sore throat and trouble swallowing.    Respiratory: Negative for cough and shortness of breath.    Cardiovascular: Negative for chest pain and leg swelling.   Gastrointestinal: Positive for abdominal distention and nausea. Negative for abdominal pain, constipation, diarrhea and vomiting.   Genitourinary: Negative for decreased urine volume and difficulty urinating.   Musculoskeletal: Negative for arthralgias and back pain.   Skin: Negative for color change and pallor.   Neurological: Negative for dizziness and light-headedness.   Psychiatric/Behavioral: Positive for confusion. Negative for agitation.     Objective:     Vital Signs (Most Recent):  Temp: 96.3 °F (35.7 °C) (05/31/19 0457)  Pulse: 85 (05/31/19 0457)  Resp: 14 (05/31/19 0457)  BP: (!) 111/57 (05/31/19 0457)  SpO2: 97 % (05/31/19 0457) Vital Signs (24h Range):  Temp:  [96.3 °F (35.7 °C)-98 °F (36.7 °C)] 96.3 °F (35.7 °C)  Pulse:  [81-96] 85  Resp:  [14-20] 14  SpO2:  [95 %-98 %] 97 %  BP: ()/(51-62) 111/57     Weight: 74.4 kg (164 lb)  Body mass index is 25.69 kg/m².  Body surface area is 1.88 meters squared.      Intake/Output Summary (Last 24 hours) at 5/31/2019 0809  Last data filed at 5/31/2019 0614  Gross per 24 hour   Intake  5143.33 ml   Output --   Net 5143.33 ml       Physical Exam   Constitutional: She has a sickly appearance. No distress.   Appears tired, but nontoxic   HENT:   Mouth/Throat: Oropharynx is clear and moist.   Eyes: Scleral icterus is present.   Cardiovascular: Normal rate and regular rhythm.   Pulmonary/Chest: Effort normal and breath sounds normal.   Abdominal: Soft. She exhibits no mass. There is no tenderness. There is no guarding.   Musculoskeletal: She exhibits no edema or deformity.   Neurological: She is alert. She is disoriented. GCS eye subscore is 4. GCS verbal subscore is 4. GCS motor subscore is 6.   Skin: Skin is warm and dry. She is not diaphoretic.   Psychiatric: She has a normal mood and affect.   Poverty of thought, simple one or two word answers to questions.    Nursing note and vitals reviewed.      Significant Labs:   CBC:   Recent Labs   Lab 05/30/19  0740 05/31/19  0341   WBC 18.32* 18.14*   HGB 12.9 12.3   HCT 43.3 40.7    177   , CMP:   Recent Labs   Lab 05/30/19  0740 05/31/19  0341    141   K 4.2 4.7    109   CO2 22* 20*   GLU 75 71   BUN 26* 35*   CREATININE 0.8 1.3   CALCIUM 10.3 8.9   PROT 7.2 6.5   ALBUMIN 1.9* 1.7*   BILITOT 8.7* 9.1*   ALKPHOS 792* 688*   * 1,064*   * 217*   ANIONGAP 10 12   EGFRNONAA >60.0 48.0*   , Coagulation:   Recent Labs   Lab 05/30/19  0740 05/31/19  0341   INR 1.4* 1.5*   APTT 36.8* 41.5*    and All pertinent labs from the last 24 hours have been reviewed.    Diagnostic Results:  I have reviewed all pertinent imaging results/findings within the past 24 hours.   MRI MRCP Without Contrast  Narrative: EXAMINATION:  MRI ABDOMEN WITHOUT CONTRAST MRCP    CLINICAL HISTORY:  Abnormal results of liver function studiesAbnormal liver function tests (LFTs)    TECHNIQUE:  Multiplanar multisequence images of the abdomen before and after administration of 10 mL Gadavist intravenous contrast. Additional thick slab fluid sensitive MRCP sequences  were obtained with maximum intensity projection images for evaluation of the biliary system.    FINDINGS:  No intra or extrahepatic biliary ductal dilatation.  Extrahepatic common bile duct measures up to 6 mm within the supra pancreatic portion and tapers to 4 mm at the level of the ampulla.  There is mild caliber irregularity within the pancreatic portion.  No gallstones or choledocholithiasis.  Main pancreatic duct is normal caliber.  No pancreatic mass or large pancreatic pseudocyst.    Limited non-contrast evaluation of the upper abdomen demonstrates the following: Partially visualized bilateral breast implants.  Moderate size right pleural effusion.  Left lung bases unremarkable.  There is pectus excavatum.  Hepatomegaly with diffuse abnormal signal intensity and areas of restricted diffusion replacing the majority of the hepatic parenchyma.  Spleen and adrenal glands unremarkable.  1.6 cm circumscribed T2 hyperintensity in the left kidney likely represents a cyst.  No hydronephrosis.  No mesenteric mass.  Mild mesenteric edema and trace scattered intraperitoneal free fluid.  Moderate body wall edema.  Visualized loops of small large bowel demonstrate no evidence of obstruction or inflammation.  No pathologic lymphadenopathy identified.  S-shaped scoliosis and heterogenous diffusion restriction throughout the visualized axial skeleton.  Impression: Mild caliber irregularity within the pancreatic portion of the common bile duct may represent mild stricture.  No upstream intra or extrahepatic biliary ductal dilatation. Further assessment with ERCP as clinically warranted.    Hepatomegaly with diffuse abnormal signal intensity and areas of restricted diffusion replacing the majority of the hepatic parenchyma compatible with biopsy proven infiltrating metastatic disease.  Component of superimposed hepatovenoocclusive disease cannot be excluded.    Patchy diffusion restriction throughout the visualized axial  skeleton compatible with known osseous metastatic disease.    Moderate right pleural effusion, trace ascites and moderate anasarca.    This report was flagged in Epic as abnormal.    Electronically signed by resident: Juan Polanco  Date:    05/30/2019  Time:    10:21    Electronically signed by: Jonathon Meyer MD  Date:    05/30/2019  Time:    12:25  CT Chest With Contrast  Narrative: EXAMINATION:  CT CHEST WITH CONTRAST    CLINICAL HISTORY:  Stage 4 breast cancer; Malignant neoplasm of unspecified site of unspecified female breast    TECHNIQUE:  Low dose axial images, sagittal and coronal reformations were obtained from the thoracic inlet to the lung bases following the IV administration of 75 mL of Omnipaque 350.    COMPARISON:  CT chest non coronary 04/27/2019; CT chest with contrast 03/29/2018    FINDINGS:  THORACIC CONFIGURATION:    Significant thoracic dextroscoliosis and pectus excavatum resulting in considerable thoracic anatomic distortion.    BASE OF NECK:    Soft tissue structures at the base of the neck are unremarkable.  Thyroid gland is normal in symmetric.    EXTRATHORACIC SOFT TISSUES:    Extra thoracic soft tissues demonstrate bilateral mastectomy with breast reconstruction including breast implants.    AORTA AND CORONARY ARTERIES:    Three vessel left-sided aortic arch.  No evidence of aneurysm or significant atherosclerosis.  There is right ventricular hypertrophy with trace pericardial effusion and displaced left lateral midline.  No significant coronary artery atherosclerosis.    LYMPH NODES:    Enlarged right axillary node measuring 1.3 cm in short axis.  Scattered normal appearing lymph nodes in the mediastinum and joie.    PULMONARY ARTERIES AND VEINS:    Pulmonary arteries distribute normally.  Questionable prominence of the pulmonary trunk and pulmonary arteries, as such pulmonary artery hypertension cannot be excluded.  There are 4 pulmonary veins.    AIRWAYS AND LUNGS:    The trachea  and proximal airways are patent.  Increased lucency in the right lower lobe reflecting stable hyperexpansion, with interval development of moderate volume right-sided pleural fluid.  Stable mixed density linear consolidation along the right major fissure could reflect subsegmental atelectasis, scarring, or sequelae of inspissated secretions.    PLEURA:    New dependent right pleural fluid in this patient with a history of metastatic breast carcinoma.  We cannot exclude metastatic disease as the origin of this fluid.    No pneumothorax or new focal opacification identified.    ESOPHAGUS AND UPPER ABDOMEN:    The esophagus is normal in contour the tortuous in course in keeping with aforementioned scoliotic curvature of the thoracic spine.  Partially visualized upper abdominal structures reveal mild volume peritoneal fluid neural thighs unremarkable.    BONES:    -Osseous structures demonstrate age advanced degenerative changes.    -Extensive lytic and blastic bone metastases, relatively stable compared to chest CT of 03/29/2019 although progressed from chest CT of 10/05/2018..    -Subacute fractures involving the lateral aspect of the right 5th and 7th ribs, with remote fracture of the right 8th rib.  Impression: 1.  This patient reported clinical history of metastatic breast cancer, redemonstration of mixed density linear consolidation along the right major fissure which could reflect atelectasis, scarring, or sequelae of inspissated secretions, with metastatic disease not definitively excluded in this patient with reported clinical history of metastatic breast cancer.  This lesion can be followed with expected oncologic surveillance.    2.  Numerous lytic lesions identified throughout the visualized axial skeleton, stable compared to CT chest of 03/29/2019, though new from CT chest of 10/05/2018, concerning for metastatic disease.  Subacute pathologic fractures involving the lateral aspect of the right 5th and 7th  ribs.    3.  Interval development of moderate volume right-sided pleural effusion.  Clinical significance is unclear in this patient with a history of metastatic breast carcinoma.  If assessment for possible pleural metastases is warranted, please obtain chest CT with intravenous contrast medium in the late systemic arterial phase of enhancement in order to opacify structures supplied by intercostal arteries.    4.  Significant thoracic dextroscoliosis and pectus excavatum.  Leftward displacement of the heart is a function of the patient's scoliosis and pectus excavatum.    5.  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    6.  Right ventricular chamber dilatation, unchanged.  Elevation of pulmonary artery pressures is not excluded.    7.  Enlarged right axillary lymph node measuring up to 1.3 cm in short axis.    This report was flagged in Epic as abnormal.    Electronically signed by resident: Mando Rios MD  Date:    05/30/2019  Time:    07:53    Electronically signed by: Paula Hartley MD  Date:    05/30/2019  Time:    09:44  MRI Brain W WO Contrast  Narrative: EXAMINATION:  MRI BRAIN W WO CONTRAST    CLINICAL HISTORY:  concern for worsening mets (Breast Cancer Stage IV);.    TECHNIQUE:  Multiplanar multisequence MR imaging of the brain was performed before and after the administration of 8 mL Gadavist  intravenous contrast.    COMPARISON:  Correlation is made to MRI brain 05/24/2019    FINDINGS:  There is a 0.6 cm focus of abnormal postcontrast enhancement in the right cerebellum concerning for small intracranial metastatic focus in this patient with reported breast cancer.  There is an additional subtle 0.3 cm focus of enhancement within the left cerebellum (axial 3D MPRAGE image 43), 0.5 cm focus of abnormal enhancement within the posterior right frontal lobe, 0.7 cm region of enhancement in the right frontal lobe with two additional  subtle punctate foci of more inferiorly (axial T1 post-contrast images 19-21), concerning for additional small foci of intracranial metastatic disease.  These lesions demonstrate slight associated T2/FLAIR signal hyperintensity.  There are multiple enhancing calvarial lesions concerning for osseous metastatic disease.    There is no abnormal restricted diffusion to suggest acute infarction. The ventricles are normal in size and configuration without evidence for hydrocephalus. There are no abnormal areas of gradient susceptibility to suggest parenchymal hemorrhage.  There are additional foci of T2/FLAIR signal hyperintensity involving the deep cerebral and periventricular white matter without corresponding restricted diffusion or postcontrast enhancement.  While nonspecific, findings likely reflect sequela of chronic microvascular ischemic change.  No extra-axial hemorrhage or fluid collections.  The craniocervical junction and sellar regions are within normal limits.  Impression: 1. Small enhancing intra-axial lesions largest of which are within the right frontal lobe and right cerebellum concerning for intracranial metastatic disease as detailed above.  No associated hydrocephalus or midline shift.  2. Multiple enhancing calvarial osseous metastatic lesions.  3. No evidence of acute infarction or other acute intracranial abnormality.    Electronically signed by: Angela King MD  Date:    05/30/2019  Time:    03:25

## 2019-05-31 NOTE — ASSESSMENT & PLAN NOTE
- Stage IV Breast Cancer (following with [facility]) ER positive, [place] negative, HER2 negative with mets to liver, bone and brain.  - Last MRI brain showed (5/16/2019) 7 mm right cerebellar metastasis and possible nodule of enhancement in the right frontal lobe (axial image 130, postcontrast axials).  - Currently presented with oncological emergency as HyperCa from underlying bone mets.   - Dr. Velazco, Dr. Staton, and Dr. Garcia discussed possibility of inpatient chemo, and concluded that inpatient chemo presented significant risk of decline with little expected benefit for patient.

## 2019-05-31 NOTE — PROGRESS NOTES
Met with  at the bedside late yesterday afternoon. He was very tearful. He told me about his wife and his children. He had not told his children yet that he was planning to bring her home on hospice. Provided him emotional support. Followed up with  this morning. His two daughters and multiple family members had come to the hospital. He stated that they have changed plans and will be staying here after speaking with the doctors. Again offered emotional support.

## 2019-05-31 NOTE — ASSESSMENT & PLAN NOTE
Pt underwent paracentesis with IR 5/29 with 2400cc removed. Given that patient is considering hospice with a life expectancy of weeks, Seen at bedside by IR for PleurX catheter; will proceed with US evaluation after pt and  meet with their daughters this AM.

## 2019-05-31 NOTE — PLAN OF CARE
MDR's with Dr Saxena.  Patient's workup confirmed progression of metastatic cancer.  The patient's  and daughter's are at bedside.  Plan to transition to comfort care.  Patient will likely remain IP as she will be too unstable to survive a transfer.  All unnecessary interventions d/c.  CM will continue to follow.

## 2019-05-31 NOTE — PLAN OF CARE
Problem: Adult Inpatient Plan of Care  Goal: Plan of Care Review  Outcome: Ongoing (interventions implemented as appropriate)  Plan of care reviewed with the patient and her  at the beginning of shift. The patient received pain medications prior to shift. The patient was arousable to voice, but would not stay aroused. Vitals trended down during the night. On call resident contacted. Plan of care to continue. Pt is to be discharged to home hospice tomorrow. Bed in low locked position. Call bell within reach. Will continue to monitor.

## 2019-06-01 NOTE — HOSPITAL COURSE
5/30/2019 MRCP, MRI Brain, Paracentesis, CT chest completed overnight. Pt lying in bed with frequent redirection from  to stay in bed. Pt denies pain but endorses confusion.   5/31/2019 Pt somnolent overnight after receiving IV Dilaudid 1mg yesterday afternoon; pt more awake this AM, opens eyes spontaneously and answers to voice. Pt's  reports that their daughters will be at bedside this morning, and afterwards they would like to proceed with IR evaluation/PleurX placement.   06/01/2019 1:00 AM Called to patients bedside by nurse Told that patient Mrs. Nani Mckeon is no longer breathing. Patients lungs auscultated with absent breath sounds.  Patient's chest wall without rise and fall.  Auscultated patients's heart.  Patient with absent heart sounds.  Patient without peripheral pulses on palpation of the radial arteries in the wrist.  Patient's pupils unreactive to light.  Time of Death 0100 06/01/2019 .  Patient's preliminary cause of death multiorgan failure due worsening malignancy.

## 2019-06-01 NOTE — DISCHARGE SUMMARY
[facility]  Hematology  Bone Marrow Transplant  Discharge Summary      Patient Name: Nani Mckeon  MRN: [id number]   Admission Date: 5/28/2019  Hospital Length of Stay: 4 days  Discharge Date and Time:  06/01/2019 1:07 AM  Attending Physician: Tawnya Velazco MD   Discharging Provider: Dayron Joe MD  Primary Care Provider: Conrad Syed III, MD    HPI: This is Ms. Nani Mckeon, 48 year old female with medical history of Stage IV Breast Cancer (following with [facility]) ER positive, [place] negative, HER2 negative with mets to liver, bone and brain. She presented to progressive weakness, failure to thrive, bone pain and worsening balance. She endorsed compliance with her tamoxifen. She was using oxygen at home for her chronic respiratory failure with hypoxia (possible nodular mets versus her underlying pericardial effusion). She denies abdominal pain, changes in bowel movements, urinary symptoms, chest pain, fevers or leg swelling. In ED, she was at her baseline of oxygen requirement, no signs of respiratory distress, and other vitals stable.      * No surgery found *     Hospital Course: 5/30/2019 MRCP, MRI Brain, Paracentesis, CT chest completed overnight. Pt lying in bed with frequent redirection from  to stay in bed. Pt denies pain but endorses confusion.   5/31/2019 Pt somnolent overnight after receiving IV Dilaudid 1mg yesterday afternoon; pt more awake this AM, opens eyes spontaneously and answers to voice. Pt's  reports that their daughters will be at bedside this morning, and afterwards they would like to proceed with IR evaluation/PleurX placement.   06/01/2019 1:00 AM Called to patients bedside by nurse Told that patient Mrs. Nani Mckeon is no longer breathing. Patients lungs auscultated with absent breath sounds.  Patient's chest wall without rise and fall.  Auscultated patients's heart.  Patient with absent heart sounds.  Patient  without peripheral pulses on palpation of the radial arteries in the wrist.  Patient's pupils unreactive to light.  Time of Death 0100 06/01/2019 .  Patient's preliminary cause of death multiorgan failure due worsening malignancy.    Consults (From admission, onward)        Status Ordering Provider     Inpatient consult to Advanced Endoscopy Service ([facility])  Once     Provider:  (Not yet assigned)    Completed TESS ECLAYA     Inpatient consult to Hematology/Oncology  Once     Provider:  (Not yet assigned)    Completed SHRADDHA SMITH     Inpatient consult to Interventional Radiology  Once     Provider:  (Not yet assigned)    Completed AMEENA WHITAKER     Inpatient consult to Social Work  Once     Provider:  (Not yet assigned)    Acknowledged TESS CELAYA          Significant Diagnostic Studies: Labs:   BMP:   Recent Labs   Lab 05/30/19  0740 05/31/19  0341   GLU 75 71    141   K 4.2 4.7    109   CO2 22* 20*   BUN 26* 35*   CREATININE 0.8 1.3   CALCIUM 10.3 8.9   MG 1.8 1.8   , CMP   Recent Labs   Lab 05/30/19  0740 05/31/19  0341    141   K 4.2 4.7    109   CO2 22* 20*   GLU 75 71   BUN 26* 35*   CREATININE 0.8 1.3   CALCIUM 10.3 8.9   PROT 7.2 6.5   ALBUMIN 1.9* 1.7*   BILITOT 8.7* 9.1*   ALKPHOS 792* 688*   * 1,064*   * 217*   ANIONGAP 10 12   ESTGFRAFRICA >60.0 55.3*   EGFRNONAA >60.0 48.0*    and CBC   Recent Labs   Lab 05/30/19  0740 05/31/19  0341   WBC 18.32* 18.14*   HGB 12.9 12.3   HCT 43.3 40.7    177     Microbiology:   Blood Culture   Lab Results   Component Value Date    LABBLOO No Growth to date 05/28/2019    LABBLOO No Growth to date 05/28/2019    LABBLOO No Growth to date 05/28/2019    LABBLOO No Growth to date 05/28/2019   , Sputum Culture No results found for: GSRESP, RESPIRATORYC and Urine Culture  No results found for: LABURIN  Radiology: None    Pending Diagnostic Studies:     Procedure Component Value Units Date/Time     Comprehensive BRCA 1/2 Analysis [[id number]] Collected:  19 1557    Order Status:  Sent Lab Status:  In process Updated:  19 1605    Specimen:  Blood     Cytology Specimen-Medical Cytology (Fluid/Wash/Brush) [[id number]] Collected:  19 1602    Order Status:  Sent Lab Status:  No result     Specimen:  Ascites     IR Peritoneal Tunneled Cath without port [[id number]]     Order Status:  Sent Lab Status:  No result         Final Active Diagnoses:    Diagnosis Date Noted POA    PRINCIPAL PROBLEM:  Hypercalcemia of malignancy [E83.52] 2019 Yes    Stage IV Breast Cancer with multi-organ mets  [C50.919] 06/15/2017 Yes    Transaminasemia [R74.0] 2019 Yes    Atrial fibrillation [I48.91] 2019 Yes    Anxiety and depression [F41.9, F32.9] 2015 Yes    Chronic hypoxemic respiratory failure [J96.11] 2016 Yes    Pericardial effusion [I31.3] 2019 Yes    Malignant neoplasm of breast, stage 4 [C50.919] 2019 Yes    Malignant ascites [R18.0] 2019 Yes    Hyperbilirubinemia [E80.6] 2019 Yes    Failure to thrive syndrome, adult [R62.7] 2019 Yes      Problems Resolved During this Admission:      Discharged Condition:     Disposition:     Follow Up:    Patient Instructions:   No discharge procedures on file.  Medications:  None (patient  at medical facility)    Dayron Joe MD  Bone Marrow Transplant  [facility]

## 2019-06-01 NOTE — SIGNIFICANT EVENT
Death Note     Called to patients bedside by nurse Told that patient Mrs. Nani Mckeon is no longer breathing. Patients lungs auscultated with absent breath sounds.  Patient's chest wall without rise and fall.  Auscultated patients's heart.  Patient with absent heart sounds.  Patient without peripheral pulses on palpation of the radial arteries in the wrist.  Patient's pupils unreactive to light.  Time of Death 0100 06/01/2019 .  Patient's preliminary cause of death multiorgan failure due worsening malignancy.    Blank Joe MD  Internal Medicine Resident (PGY-III)  Pager: [phone]  Tel: [phone]  Email: [email or web address]

## 2019-06-01 NOTE — HPI
This is Ms. Nani Mckeon, 48 year old female with medical history of Stage IV Breast Cancer (following with [facility]) ER positive, [place] negative, HER2 negative with mets to liver, bone and brain. She presented to progressive weakness, failure to thrive, bone pain and worsening balance. She endorsed compliance with her tamoxifen. She was using oxygen at home for her chronic respiratory failure with hypoxia (possible nodular mets versus her underlying pericardial effusion). She denies abdominal pain, changes in bowel movements, urinary symptoms, chest pain, fevers or leg swelling. In ED, she was at her baseline of oxygen requirement, no signs of respiratory distress, and other vitals stable.

## 2019-06-02 LAB — BACTERIA BLD CULT: NORMAL

## 2019-06-03 NOTE — PHYSICIAN QUERY
"PT Name: Nani Mckeon  MR #: [id number]     CDS: Martha Miner RN     Contact information:[email or web address]    This form is a permanent document in the medical record.     Query Date: Kristie 3, 2019    Physician Query - Neurological Condition Clarification    By submitting this query, we are merely seeking further clarification of documentation to reflect the severity of illness of your patient. Please utilize your independent clinical judgment when addressing the question(s) below.    The Medical record reflects the following:     Indicators   Supporting Clinical Findings Location in Medical Record   x AMS, Confusion,  LOC, etc.  Pt. Confused, oriented to person, place. Not situation, time.  states this has been going on for a "little while".    Pt arrived to MRI confused and unable to answer questions  Pt unable to follow commands and removing coil and O2 Sat probe    Pt is very confused, oriented to self only. Elevated Ca levels on admit, labs today pending. IVF infusing. Elevated ammonia levels. Lactulose given, one large BM this morning.    endorses confusion ED RN Triage Note 5/28      IR RN PN 5/29      RN POC 5/30        Hem/Onc PN 5/30   x Acute / Chronic Illness 48 year old female with medical history of Stage IV Breast Cancer (following with [facility]) ER positive, TN negative, HER2 negative with mets to liver, bone and brain.  Lethargic H&P 5/28   x Radiology Findings 1. Small enhancing intra-axial lesions largest of which are within the right frontal lobe and right cerebellum concerning for intracranial metastatic disease as detailed above.  No associated hydrocephalus or midline shift.  2. Multiple enhancing calvarial osseous metastatic lesions.   MRI Brain 5/29   x Electrolyte Imbalance  5/29 5/30 5/31   Ammonia 71  72  93       5/28 5/29   Calcium 12.9  11.9    Lab Results    x Medication lactulose 20 gram/30 mL solution Soln 10 g   Ordered Dose: 10 g   Route: " Oral   Frequency: 3 times daily    lactulose 20 gram/30 mL solution Soln 20 g  Ordered Dose: 20 g   Route: Oral   Frequency: 3 times daily MAR- Given 5/30          MAR- Given 5/29, 5/30    Treatment              Other       Encephalopathy- is a general term for any diffuse disease of the brain that alters brain function or structure. Treatment of the cognitive dysfunction varies but is ultimately dependent on the treatment of the underlying condition.    Major Symptoms of Encephalopathy - Decreased level of consciousness, fluctuating alertness/concentration, confusion, agitation, lethargy, somnolence, drowsiness, obtundation, stupor, or coma.         References: National Institutes of Healths (NIH) National [place] of Neurological Disorders and Strokes;  HCPro 2016; Advisory Board     Clinical Guidelines:   These guidelines will set system standards to assist providers in managing, documentation, and coding of encephalopathy. The intent of this document is to serve as a system guideline, not replace the providers clinical judgment:    Provider, please specify the diagnosis or diagnoses associated with above clinical findings.    [x   ] Hepatic Encephalopathy - Due to liver disease/failure   [   ] Metabolic Encephalopathy - Due to electrolye imbalance, metabolic derangements, or infections processes, includes Septic Encephalopathy   [   ] Other Encephalopathy - Includes uremic encephalopathy   [   ] Unspecified Encephalopathy      [   ] Other Neurological Condition-  Includes Post-ictal altered mental status. (please specify condition): _________   [   ]  Clinically Undetermined     Please document in your progress notes daily for the duration of treatment until resolved, and include in your discharge summary.

## 2019-06-16 LAB
ANNOTATION COMMENT IMP: NORMAL
BRCA INTERPRETATION: NORMAL
BRCA RELEASED BY: NORMAL
BRCA RESULT: NORMAL
MOL DX INTERP BLD/T QL: NEGATIVE
SPECIMEN SOURCE: NORMAL
SPECIMEN SOURCE: NORMAL